# Patient Record
Sex: FEMALE | Race: BLACK OR AFRICAN AMERICAN | Employment: UNEMPLOYED | URBAN - METROPOLITAN AREA
[De-identification: names, ages, dates, MRNs, and addresses within clinical notes are randomized per-mention and may not be internally consistent; named-entity substitution may affect disease eponyms.]

---

## 2018-02-10 ENCOUNTER — HOSPITAL ENCOUNTER (EMERGENCY)
Age: 33
Discharge: HOME | End: 2018-02-10
Attending: EMERGENCY MEDICINE
Payer: MEDICAID

## 2018-02-10 VITALS
WEIGHT: 242.73 LBS | OXYGEN SATURATION: 100 % | SYSTOLIC BLOOD PRESSURE: 135 MMHG | TEMPERATURE: 98.4 F | HEIGHT: 66 IN | BODY MASS INDEX: 39.01 KG/M2 | HEART RATE: 66 BPM | DIASTOLIC BLOOD PRESSURE: 72 MMHG | RESPIRATION RATE: 16 BRPM

## 2018-02-10 DIAGNOSIS — J06.9 VIRAL URI: Primary | ICD-10-CM

## 2018-02-10 PROCEDURE — 99282 EMERGENCY DEPT VISIT SF MDM: CPT

## 2018-02-10 RX ORDER — CETIRIZINE HYDROCHLORIDE, PSEUDOEPHEDRINE HYDROCHLORIDE 5; 120 MG/1; MG/1
1 TABLET, FILM COATED, EXTENDED RELEASE ORAL 2 TIMES DAILY
Qty: 20 TAB | Refills: 0 | Status: SHIPPED | OUTPATIENT
Start: 2018-02-10 | End: 2018-02-10

## 2018-02-10 RX ORDER — CETIRIZINE HYDROCHLORIDE, PSEUDOEPHEDRINE HYDROCHLORIDE 5; 120 MG/1; MG/1
1 TABLET, FILM COATED, EXTENDED RELEASE ORAL 2 TIMES DAILY
Qty: 20 TAB | Refills: 0 | Status: SHIPPED | OUTPATIENT
Start: 2018-02-10 | End: 2018-05-13

## 2018-02-10 NOTE — DISCHARGE INSTRUCTIONS
[facility] SYSTEMS Departments     For adult and child immunizations, family planning, TB screening, STD testing and women's health services. [facility]: [place] [phone]      [facility] 25   [address]   [address]   170 [facility]: Carlos Levin 200 [facility] [address] [phone] [address]          [address] [place] [address]     For primary care services, woman and child wellness, and some clinics providing specialty care. [facility] -- [facility]1 [facility] 2525 [facility] [phone]   411 [facility] 200 [facility] 3614 [facility] [phone]   339 [facility] [address] [facility] [address] [phone] [address]  [employer] [address] [place] [address] 5850  Community  [phone]   7700 [address] [place] [phone]   [facility] 81 [facility] [phone]   [address] [phone]   [facility]: [facility] [address] [place] [address], [address] [phone]     [address] [phone]   [facility] Outreach 5850  Community  [phone]   [employer]  [address], [address] ([email or web address]) [phone]         Sexual Health/Woman Wellness Clinics    For STD/HIV testing and treatment, pregnancy testing and services, men's health, birth control services, LGBT services, and hepatitis/HPV vaccine services. [employer] for [place] [employer] 201 [address]. [facility] [address] [facility] [address] [facility] 1579 600 SERGIOLeandro Prater [phone]   [facility] [address], 5th floor [phone]   Pregnancy [address] 2201 [facility] 118 NLeandro Ortiz [facility] [phone]         Specialty Service 1707 [facility]   [phone]   [employer]   [phone]   Women, Infant and Children's Services: [address] [phone]       600 [facility]   [phone]   [address]   [facility] Psychiatry     [phone]   [address] Crisis   [address] [phone]     Local Primary Care Physicians  [facility] [phone]  MD Dari Taveras MD Evertt Ingles, MD [facility] Doctors [phone]  Christina Mena, FNP  Alfonso Zaragoza, MD Karleen Moritz, MD Beata Dill [place] [address] [phone]  MD Bobby Villa MD 19964 [facility] [phone]  MD Lauren Good MD Arno Cellar, MD Creta Maple, MD   [facility] [phone]  [facility] KELLE MARTE, MD Per Horta MD  Mliss , NP [address] [phone]  MD Olvin Kirkland, MD Jaspal Shore, MD Tylene Mace, MD Dany Batty, MD Pearlean Rubinstein, MD Justo Arena, MD   33 57 [facility]  Glen Palomino MD [address] [phone]  MD Zainab Hernández, NP  MD Eliane Gastelum MD Leonetta Newborn, MD Bea Kapur, MD Stana Chiquito, MD   7880 [facility] [phone]  MD Laurence Ayala, [facility]  Inna Lugo, NP Beverlee Hines, MD Mathew Grad, MD Severo Leech, MD Ihor Lapine, MD [facility] [phone]  MD Shahnaz Camilo MD Benn Albert, MD Hale Lacer, MD Francesca Payment, MD   [address] [phone]  MD Jeremy Sampson MD [place] [phone]  MD Franck Woo MD [facility] Ayaka Nikkies, [address] [facility] Physicians [phone]  Prisca Schulz, MD Bird Orr, MD Armando Davis, MD Paty Estrella, MD Esme Luu, MD Toan Pepe, JOSHUA Almonte MD 1619  66   [phone]  Mi Westbrook, MD Valerie Cortez, MD Marvin Mccracken MD   2102 [facility] [phone]  Bi Price, MD Ernestine Vazquez, FNP  Jacques Garcia, PA-FAYE Garcia, FNP  Kristie Guy, PA-FAYE Flores, MD Quentin Jovel, NP   Alexey Mueller, DO Miscellaneous:  Pravin Taylor [phone]

## 2018-02-10 NOTE — ED PROVIDER NOTES
EMERGENCY DEPARTMENT HISTORY AND PHYSICAL EXAM      Date: 2/10/2018  Patient Name: Dayami Nagy    History of Presenting Illness     Chief Complaint   Patient presents with    Sore Throat     x 1 week, got better and then worse again    Headache     rt frontal headache x1 week    Other     \"I've got these bumps on the back of my tongue, and I just want to make sure it's not. ...you know. \"  No vaginal discharge or other sx       History Provided By: Patient    HPI: Dayami Nagy, 26 y.o. female, presents ambulatory to the ED with cc of right frontal intermittent sinus pressure with associated worsening HA and sore throat for one week. Pt also states that she \"has white bumps on the back of my tongue\". She denies any vaginal discharge    PCP: None    There are no other complaints, changes, or physical findings at this time. Current Outpatient Prescriptions   Medication Sig Dispense Refill    cetirizine-psuedoePHEDrine (ZYRTEC-D) 5-120 mg per tablet Take 1 Tab by mouth two (2) times a day. 20 Tab 0    azithromycin (ZITHROMAX Z-CASA) 250 mg tablet 2 today then 1 daily for 4 days 6 Tab 0    triamcinolone acetonide (KENALOG) 0.1 % topical cream Apply  to affected area two (2) times a day. use thin layer 15 g 0    predniSONE (STERAPRED) 5 mg dose pack See administration instruction per 5mg dose pack 21 Tab 0    lamoTRIgine (LAMICTAL) 25 mg tablet Take  by mouth daily. Past History     Past Medical History:  Past Medical History:   Diagnosis Date    Bipolar disorder ([facility] [address] 11/4/2013    Depression 3/29/2012    Endometriosis 11/20/2012    Obesity 2/23/2015    UTI (lower urinary tract infection) 7/30/2012    Vertigo        Past Surgical History:  Past Surgical History:   Procedure Laterality Date    HX BREAST REDUCTION  2009    HX CHOLECYSTECTOMY  1998    HX DILATION AND CURETTAGE      btl    HX GYN      Tubal ligation, D & C       Family History:  History reviewed. No pertinent family history.     Social History:  Social History   Substance Use Topics    Smoking status: Never Smoker    Smokeless tobacco: Never Used    Alcohol use No       Allergies:  No Known Allergies      Review of Systems   Review of Systems   Constitutional: Negative for fatigue and fever. HENT: Positive for sinus pressure and sore throat. Negative for ear pain. Eyes: Negative for pain, redness and visual disturbance. Respiratory: Negative for cough and shortness of breath. Cardiovascular: Negative for chest pain and palpitations. Gastrointestinal: Negative for abdominal pain, nausea and vomiting. Genitourinary: Negative for dysuria, frequency and urgency. Musculoskeletal: Negative for back pain, gait problem, neck pain and neck stiffness. Skin: Negative for rash and wound. Neurological: Positive for headaches. Negative for dizziness, weakness, light-headedness and numbness. Physical Exam   Physical Exam   Constitutional: She is oriented to person, place, and time. She appears well-developed and well-nourished. Non-toxic appearance. No distress. HENT:   Head: Normocephalic and atraumatic. Right Ear: External ear normal.   Left Ear: External ear normal.   Nose: Nose normal.   Mouth/Throat: Uvula is midline. No trismus in the jaw. No oropharyngeal exudate, posterior oropharyngeal edema or posterior oropharyngeal erythema. Tonsils are flat   Eyes: Conjunctivae and EOM are normal. Pupils are equal, round, and reactive to light. No scleral icterus. Neck: Normal range of motion and full passive range of motion without pain. Cardiovascular: Normal rate and regular rhythm. Pulmonary/Chest: Effort normal. No accessory muscle usage. No tachypnea. No respiratory distress. She has no decreased breath sounds. She has no wheezes. Abdominal: Soft. There is no tenderness. Musculoskeletal: Normal range of motion. Neurological: She is alert and oriented to person, place, and time. She is not disoriented.  No cranial nerve deficit. GCS eye subscore is 4. GCS verbal subscore is 5. GCS motor subscore is 6. Skin: Skin is intact. No rash noted. Psychiatric: She has a normal mood and affect. Her speech is normal.   Nursing note and vitals reviewed. Medical Decision Making   I am the first provider for this patient. I reviewed the vital signs, available nursing notes, past medical history, past surgical history, family history and social history. Vital Signs-Reviewed the patient's vital signs. Patient Vitals for the past 12 hrs:   Temp Pulse Resp BP SpO2   02/10/18 1655 98.4 °F (36.9 °C) 66 16 135/72 100 %       Pulse Oximetry Analysis - 100% on room air    Cardiac Monitor:   Rate: 66 bpm  Rhythm: Normal Sinus Rhythm 135/72     Records Reviewed: Nursing Notes and Old Medical Records    Provider Notes (Medical Decision Making): Afebrile. Well appearing. Well hydrated. Non toxic appearance. Constellation of symptoms suggest viral URI. Plan as below. ED Course:   Initial assessment performed. The patients presenting problems have been discussed, and they are in agreement with the care plan formulated and outlined with them. I have encouraged them to ask questions as they arise throughout their visit. Disposition:  DISCHARGE NOTE  5:38 PM  The patient has been re-evaluated and is ready for discharge. Reviewed available results with patient. Counseled patient on diagnosis and care plan. Patient has expressed understanding, and all questions have been answered. Patient agrees with plan and agrees to follow up as recommended, or return to the ED if their symptoms worsen. Discharge instructions have been provided and explained to the patient, along with reasons to return to the ED. PLAN:  1.    Discharge Medication List as of 2/10/2018  5:37 PM      START taking these medications    Details   cetirizine-psuedoePHEDrine (ZYRTEC-D) 5-120 mg per tablet Take 1 Tab by mouth two (2) times a day., Print, Disp-20 Tab, R-0 CONTINUE these medications which have NOT CHANGED    Details   azithromycin (ZITHROMAX Z-CASA) 250 mg tablet 2 today then 1 daily for 4 days, Print, Disp-6 Tab, R-0      triamcinolone acetonide (KENALOG) 0.1 % topical cream Apply  to affected area two (2) times a day. use thin layer, Print, Disp-15 g, R-0      predniSONE (STERAPRED) 5 mg dose pack See administration instruction per 5mg dose pack, Print, Disp-21 Tab, R-0      lamoTRIgine (LAMICTAL) 25 mg tablet Take  by mouth daily. , Historical Med           2. Follow-up Information     Follow up With Details Comments [address] Schedule an appointment as soon as possible for a visit PRIMARY CARE: call to schedule follow up [address] [place] [address] [phone]        Return to ED if worse     Diagnosis     Clinical Impression:   1. Viral URI        Attestations: This note is prepared by Law Esquivel, acting as Scribe for CLAIRE Dowd. CLAIRE Dowd: The scribe's documentation has been prepared under my direction and personally reviewed by me in its entirety. I confirm that the note above accurately reflects all work, treatment, procedures, and medical decision making performed by me.

## 2018-05-13 ENCOUNTER — HOSPITAL ENCOUNTER (EMERGENCY)
Age: 33
Discharge: HOME | End: 2018-05-13
Attending: EMERGENCY MEDICINE | Admitting: EMERGENCY MEDICINE
Payer: MEDICAID

## 2018-05-13 VITALS
SYSTOLIC BLOOD PRESSURE: 146 MMHG | OXYGEN SATURATION: 100 % | HEART RATE: 63 BPM | RESPIRATION RATE: 16 BRPM | HEIGHT: 66 IN | BODY MASS INDEX: 39.29 KG/M2 | DIASTOLIC BLOOD PRESSURE: 89 MMHG | TEMPERATURE: 98.5 F | WEIGHT: 244.49 LBS

## 2018-05-13 DIAGNOSIS — R30.0 DYSURIA: Primary | ICD-10-CM

## 2018-05-13 DIAGNOSIS — Z20.2 POSSIBLE EXPOSURE TO STD: ICD-10-CM

## 2018-05-13 LAB
APPEARANCE UR: CLEAR
BACTERIA URNS QL MICRO: NEGATIVE /HPF
BILIRUB UR QL: NEGATIVE
COLOR UR: ABNORMAL
EPITH CASTS URNS QL MICRO: ABNORMAL /LPF
GLUCOSE UR STRIP.AUTO-MCNC: NEGATIVE MG/DL
HCG UR QL: NEGATIVE
HGB UR QL STRIP: ABNORMAL
HYALINE CASTS URNS QL MICRO: ABNORMAL /LPF (ref 0–5)
KETONES UR QL STRIP.AUTO: NEGATIVE MG/DL
LEUKOCYTE ESTERASE UR QL STRIP.AUTO: NEGATIVE
NITRITE UR QL STRIP.AUTO: NEGATIVE
PH UR STRIP: 6 [PH] (ref 5–8)
PROT UR STRIP-MCNC: NEGATIVE MG/DL
RBC #/AREA URNS HPF: ABNORMAL /HPF (ref 0–5)
SP GR UR REFRACTOMETRY: 1.02 (ref 1–1.03)
UA: UC IF INDICATED,UAUC: ABNORMAL
UROBILINOGEN UR QL STRIP.AUTO: 0.2 EU/DL (ref 0.2–1)
WBC URNS QL MICRO: ABNORMAL /HPF (ref 0–4)

## 2018-05-13 PROCEDURE — 74011250636 HC RX REV CODE- 250/636: Performed by: PHYSICIAN ASSISTANT

## 2018-05-13 PROCEDURE — 74011000250 HC RX REV CODE- 250: Performed by: PHYSICIAN ASSISTANT

## 2018-05-13 PROCEDURE — 81025 URINE PREGNANCY TEST: CPT | Performed by: EMERGENCY MEDICINE

## 2018-05-13 PROCEDURE — 81001 URINALYSIS AUTO W/SCOPE: CPT | Performed by: EMERGENCY MEDICINE

## 2018-05-13 PROCEDURE — 96372 THER/PROPH/DIAG INJ SC/IM: CPT

## 2018-05-13 PROCEDURE — 74011250637 HC RX REV CODE- 250/637: Performed by: PHYSICIAN ASSISTANT

## 2018-05-13 PROCEDURE — 99282 EMERGENCY DEPT VISIT SF MDM: CPT

## 2018-05-13 RX ORDER — AZITHROMYCIN 250 MG/1
1000 TABLET, FILM COATED ORAL
Status: COMPLETED | OUTPATIENT
Start: 2018-05-13 | End: 2018-05-13

## 2018-05-13 RX ADMIN — LIDOCAINE HYDROCHLORIDE 1 G: 10 INJECTION, SOLUTION EPIDURAL; INFILTRATION; INTRACAUDAL; PERINEURAL at 11:08

## 2018-05-13 RX ADMIN — AZITHROMYCIN 1000 MG: 250 TABLET, FILM COATED ORAL at 11:07

## 2018-05-13 NOTE — ED PROVIDER NOTES
EMERGENCY DEPARTMENT HISTORY AND PHYSICAL EXAM      Date: 5/13/2018  Patient Name: Rolly Ceron    History of Presenting Illness     Chief Complaint   Patient presents with    Urinary Pain     pt reports to ed complaining of pain when using the bathroom for couple weeks returning in the last couple days       History Provided By: Patient    HPI: Rolly Ceron, 26 y.o. female with PMHx significant for depression, UTI, endometriosis, vertigo, bipolar disorder who presents ambulatory to the ED with cc of intermittent worsening dysuria x a couple weeks. Pt denies any associated symptoms. She denies use of medication to modify her symptoms. Pt reports a hx of recurrent UTIs. She denies any fevers, chills, nausea, vomiting, abdominal pain, HA, or hematuria. There are no other complaints, changes, or physical findings at this time. PCP: None        Past History     Past Medical History:  Past Medical History:   Diagnosis Date    Bipolar disorder ([facility] [address] 11/4/2013    Depression 3/29/2012    Endometriosis 11/20/2012    Obesity 2/23/2015    UTI (lower urinary tract infection) 7/30/2012    Vertigo        Past Surgical History:  Past Surgical History:   Procedure Laterality Date    HX BREAST REDUCTION  2009    HX CHOLECYSTECTOMY  1998    HX DILATION AND CURETTAGE      btl    HX GYN      Tubal ligation, D & C       Family History:  History reviewed. No pertinent family history. Social History:  Social History   Substance Use Topics    Smoking status: Never Smoker    Smokeless tobacco: Never Used    Alcohol use No       Allergies:  No Known Allergies      Review of Systems   Review of Systems   Constitutional: Negative for fatigue and fever. HENT: Negative for ear pain and sore throat. Eyes: Negative for pain, redness and visual disturbance. Respiratory: Negative for cough and shortness of breath. Cardiovascular: Negative for chest pain and palpitations.    Gastrointestinal: Negative for abdominal pain, nausea and vomiting. Genitourinary: Positive for dysuria. Negative for frequency, hematuria and urgency. Musculoskeletal: Negative for back pain, gait problem, neck pain and neck stiffness. Skin: Negative for rash and wound. Neurological: Negative for dizziness, weakness, light-headedness, numbness and headaches. Physical Exam   Physical Exam   Constitutional: She is oriented to person, place, and time. She appears well-developed and well-nourished. Non-toxic appearance. No distress. HENT:   Head: Normocephalic and atraumatic. Right Ear: External ear normal.   Left Ear: External ear normal.   Nose: Nose normal.   Mouth/Throat: Uvula is midline. No trismus in the jaw. Eyes: Conjunctivae and EOM are normal. Pupils are equal, round, and reactive to light. No scleral icterus. Neck: Normal range of motion and full passive range of motion without pain. Cardiovascular: Normal rate and regular rhythm. Pulmonary/Chest: Effort normal. No accessory muscle usage. No tachypnea. No respiratory distress. She has no decreased breath sounds. She has no wheezes. Abdominal: Soft. There is no tenderness. Musculoskeletal: Normal range of motion. Neurological: She is alert and oriented to person, place, and time. She is not disoriented. No cranial nerve deficit. GCS eye subscore is 4. GCS verbal subscore is 5. GCS motor subscore is 6. Skin: Skin is intact. No rash noted. Psychiatric: She has a normal mood and affect. Her speech is normal.   Nursing note and vitals reviewed.     Diagnostic Study Results     Labs -     Recent Results (from the past 12 hour(s))   URINALYSIS W/ REFLEX CULTURE    Collection Time: 05/13/18  9:47 AM   Result Value Ref Range    Color YELLOW/STRAW      Appearance CLEAR CLEAR      Specific gravity 1.016 1.003 - 1.030      pH (UA) 6.0 5.0 - 8.0      Protein NEGATIVE  NEG mg/dL    Glucose NEGATIVE  NEG mg/dL    Ketone NEGATIVE  NEG mg/dL    Bilirubin NEGATIVE  NEG Blood SMALL (A) NEG      Urobilinogen 0.2 0.2 - 1.0 EU/dL    Nitrites NEGATIVE  NEG      Leukocyte Esterase NEGATIVE  NEG      WBC 0-4 0 - 4 /hpf    RBC 0-5 0 - 5 /hpf    Epithelial cells FEW FEW /lpf    Bacteria NEGATIVE  NEG /hpf    UA:UC IF INDICATED CULTURE NOT INDICATED BY UA RESULT CNI      Hyaline cast 0-2 0 - 5 /lpf   HCG URINE, QL    Collection Time: 05/13/18  9:47 AM   Result Value Ref Range    HCG urine, QL NEGATIVE  NEG         Medical Decision Making   I am the first provider for this patient. I reviewed the vital signs, available nursing notes, past medical history, past surgical history, family history and social history. Vital Signs-Reviewed the patient's vital signs. Patient Vitals for the past 12 hrs:   Temp Pulse Resp BP SpO2   05/13/18 0917 98.5 °F (36.9 °C) 63 16 146/89 100 %     Records Reviewed: Nursing Notes and Old Medical Records    Provider Notes (Medical Decision Making):     UTI, pyelonephritis    ED Course:   Initial assessment performed. The patients presenting problems have been discussed, and they are in agreement with the care plan formulated and outlined with them. I have encouraged them to ask questions as they arise throughout their visit. PROGRESS NOTE:  10:39 AM  Pt requested to be treated for STIs. Written by Corey Chacon ED scribebony, as dictated by Elijah Breaux. Disposition:    DISCHARGE NOTE  10:44 AM  The patient has been re-evaluated and is ready for discharge. Reviewed available results with patient. Counseled patient on diagnosis and care plan. Patient has expressed understanding, and all questions have been answered. Patient agrees with plan and agrees to follow up as recommended, or return to the ED if their symptoms worsen. Discharge instructions have been provided and explained to the patient, along with reasons to return to the ED. PLAN:  1. There are no discharge medications for this patient.     2.   Follow-up Information     Follow up With Details Comments 110 [facility] Schedule an appointment as soon as possible for a visit Call to schedule follow up [phone]        Return to ED if worse     Diagnosis     Clinical Impression:   1. Dysuria    2. Possible exposure to STD        Attestations: This note is prepared by Giovanni Mckoy, acting as Scribe for Elijah Conde Kidney: The scribe's documentation has been prepared under my direction and personally reviewed by me in its entirety. I confirm that the note above accurately reflects all work, treatment, procedures, and medical decision making performed by me.

## 2018-05-13 NOTE — ED NOTES
Patient presents to ED with complaints of burning and itching when she pees. Patient denies pelvic pain. She also states she has a bump on her tongue and she wanted to make sure it wasn't anything infectious. She states the bump on her tongue is causing some discomfort as well. Patient states she hadn't had sex in 6 years prior to her most recent occurrence and did not use protection. Patient sitting in chair. No further complaints noted. Patient states she came to the ED today to be treated just in case.

## 2018-05-13 NOTE — ED NOTES
CHRIS Childers reviewed discharge instructions with the patient. The patient verbalized understanding. Patient discharged home with stable vitals. Patient ambulatory out of ED with steady gait. No further complaints noted at this time.

## 2018-05-13 NOTE — DISCHARGE INSTRUCTIONS
[facility] SYSTEMS Departments     For adult and child immunizations, family planning, TB screening, STD testing and women's health services. [facility]: [place] [phone]      [facility] 25   [address]   [address]   170 [facility] Street: Reza Vo [address] [phone]      [address]          [address] [place] [address]     For primary care services, woman and child wellness, and some clinics providing specialty care. [facility] -- [address] [facility] 2525 [facility] [phone]   411 [facility] 200 [facility] 3614 [facility] [phone]   339 [facility] [address] [facility] [address] [phone] [address]  [address] [place] [address] 5850  Community  [phone]   [address] [place] [address] [address] [place] [phone]   [facility] 81 [facility] [phone]   [address] [phone]   Crossover Clinic: [facility] [address] [place] [address], [address] [phone]     [facility] [address] [facility] [address] [phone]   [facility] Outreach 5850 [facility]  [phone]   [employer]  [address] ([email or web address]) [phone]         Sexual Health/Woman Wellness Clinics    For STD/HIV testing and treatment, pregnancy testing and services, men's health, birth control services, LGBT services, and hepatitis/HPV vaccine services. [employer] [place] [employer] 201 [facility] [address] [facility] 1579 [address] [phone]   [facility] [address], 5th floor [phone]   Pregnancy [address] for Women [facility] [address] Tilmon Kanner [phone]         Specialty Service 1706 [facility]   [phone]   [place]   [phone]   Women, Infant and Children's Services: [address] [phone]       600 [facility]   [phone]   [address] [place] [address] Psychiatry     [phone]   [address] Crisis   [address] [phone]     Local Primary Care Physicians  [facility] [phone]  Pearla Brittle, MD Angelia Officer, MD Maude Posada MD [facility] Doctors [phone]  Amina De La O, [facility]  Octavia Garcia, MD Jennifer Rocha, MD Beata Wright [place] [address] [phone]  MD Nirali Avalos MD [address] [facility] [phone]  MD Yfn Schaffer MD Enos Better, MD Candance Ngo, MD   [facility] [phone]  [facility], MD Shelli Tracy, MD Buster Mckeon, NP [address] [phone]  Huan Noble, MD Kyler Orta, MD Petra Solis, MD Andie Rivera MD Sherie Higashi, MD Connell Ledger, MD   [address] [facility]  Jordy Seaman MD [address] [facility] [address] [phone]  MD Agnieszka Salter, NP  Edis Taylor, MD Cintia Marte MD Nalani Heron, MD Betti Benedict, MD   9328 [facility] [phone]  Elaine Friday, MD Annia Miller, FNP  Mckeon Shed, NP  MD Leonadr Crabtree MD Elizebeth Ricks, MD Slime Ndiaye MD [facility] [phone]  MD Nhi Nguyen MD Kelle Catalina, MD Kavin Macadamia, MD Panchito Coulter MD   [address] [phone]  MD Lena Devi MD [facility] [phone]  MD Ashley Pearson MD  [facility] Arn[address]fo Burleson, [address] [facility] [phone]  Glenn Santana, MD Milton Robison, MD Adrian Liz, MD Ramos Almazan, MD Porter Schwarz, MD Jayson Barroso, JOSHUA Jules MD 1619 [facility]   [phone]  MD Jie John, MD Marv Ge MD   2102 [facility] [phone]  Bi Price, MD Jayson Navas, ABIOLA Middleton, PAJimiC  Ela Middleton, ABIOLA Zheng, PA-FAYE Torres, MD Eliane Nguyen, JOSHUA Benz, DO Miscellaneous:  Comfort Jay [phone]

## 2018-05-13 NOTE — LETTER
[address] 
\[facility] [place] [facility]\"" EMERGENCY DEPT 
[address] [place] [address] [address] [id number] [phone] Work/School Note Date: 5/13/2018 To Whom It May concern: 
 
Jong Nicholson was seen and treated today in the emergency room by the following provider(s): 
Attending Provider: Laci Horton MD 
Physician Assistant: CHRIS Olson. Jong Nicholson may return to work on [id number]. Sincerely, CHRIS Olson

## 2018-11-12 ENCOUNTER — APPOINTMENT (OUTPATIENT)
Dept: GENERAL RADIOLOGY | Age: 33
End: 2018-11-12
Attending: EMERGENCY MEDICINE
Payer: MEDICAID

## 2018-11-12 ENCOUNTER — APPOINTMENT (OUTPATIENT)
Dept: ULTRASOUND IMAGING | Age: 33
End: 2018-11-12
Attending: EMERGENCY MEDICINE
Payer: MEDICAID

## 2018-11-12 ENCOUNTER — HOSPITAL ENCOUNTER (EMERGENCY)
Age: 33
Discharge: HOME | End: 2018-11-12
Attending: EMERGENCY MEDICINE
Payer: MEDICAID

## 2018-11-12 VITALS
SYSTOLIC BLOOD PRESSURE: 147 MMHG | WEIGHT: 262.79 LBS | OXYGEN SATURATION: 99 % | TEMPERATURE: 97.9 F | DIASTOLIC BLOOD PRESSURE: 92 MMHG | BODY MASS INDEX: 42.42 KG/M2 | RESPIRATION RATE: 20 BRPM | HEART RATE: 61 BPM

## 2018-11-12 DIAGNOSIS — N61.0 MASTITIS: Primary | ICD-10-CM

## 2018-11-12 LAB
ALBUMIN SERPL-MCNC: 3.5 G/DL (ref 3.5–5)
ALBUMIN/GLOB SERPL: 0.9 {RATIO} (ref 1.1–2.2)
ALP SERPL-CCNC: 61 U/L (ref 45–117)
ALT SERPL-CCNC: 21 U/L (ref 12–78)
ANION GAP SERPL CALC-SCNC: 10 MMOL/L (ref 5–15)
AST SERPL-CCNC: 22 U/L (ref 15–37)
ATRIAL RATE: 82 BPM
BASOPHILS # BLD: 0 K/UL (ref 0–0.1)
BASOPHILS NFR BLD: 1 % (ref 0–1)
BILIRUB SERPL-MCNC: 0.3 MG/DL (ref 0.2–1)
BUN SERPL-MCNC: 12 MG/DL (ref 6–20)
BUN/CREAT SERPL: 13 (ref 12–20)
CALCIUM SERPL-MCNC: 9.4 MG/DL (ref 8.5–10.1)
CALCULATED P AXIS, ECG09: 30 DEGREES
CALCULATED R AXIS, ECG10: 21 DEGREES
CALCULATED T AXIS, ECG11: 36 DEGREES
CHLORIDE SERPL-SCNC: 104 MMOL/L (ref 97–108)
CO2 SERPL-SCNC: 25 MMOL/L (ref 21–32)
CREAT SERPL-MCNC: 0.93 MG/DL (ref 0.55–1.02)
D DIMER PPP FEU-MCNC: 0.29 MG/L FEU (ref 0–0.65)
DIAGNOSIS, 93000: NORMAL
DIFFERENTIAL METHOD BLD: ABNORMAL
EOSINOPHIL # BLD: 0.1 K/UL (ref 0–0.4)
EOSINOPHIL NFR BLD: 2 % (ref 0–7)
ERYTHROCYTE [DISTWIDTH] IN BLOOD BY AUTOMATED COUNT: 16.2 % (ref 11.5–14.5)
GLOBULIN SER CALC-MCNC: 4.1 G/DL (ref 2–4)
GLUCOSE SERPL-MCNC: 84 MG/DL (ref 65–100)
HCG UR QL: NEGATIVE
HCT VFR BLD AUTO: 33.5 % (ref 35–47)
HGB BLD-MCNC: 11 G/DL (ref 11.5–16)
IMM GRANULOCYTES # BLD: 0 K/UL (ref 0–0.04)
IMM GRANULOCYTES NFR BLD AUTO: 0 % (ref 0–0.5)
LYMPHOCYTES # BLD: 1.5 K/UL (ref 0.8–3.5)
LYMPHOCYTES NFR BLD: 24 % (ref 12–49)
MCH RBC QN AUTO: 26.4 PG (ref 26–34)
MCHC RBC AUTO-ENTMCNC: 32.8 G/DL (ref 30–36.5)
MCV RBC AUTO: 80.3 FL (ref 80–99)
MONOCYTES # BLD: 0.5 K/UL (ref 0–1)
MONOCYTES NFR BLD: 8 % (ref 5–13)
NEUTS SEG # BLD: 4.2 K/UL (ref 1.8–8)
NEUTS SEG NFR BLD: 66 % (ref 32–75)
NRBC # BLD: 0 K/UL (ref 0–0.01)
NRBC BLD-RTO: 0 PER 100 WBC
P-R INTERVAL, ECG05: 166 MS
PLATELET # BLD AUTO: 308 K/UL (ref 150–400)
PMV BLD AUTO: 11.9 FL (ref 8.9–12.9)
POTASSIUM SERPL-SCNC: 4.2 MMOL/L (ref 3.5–5.1)
PROT SERPL-MCNC: 7.6 G/DL (ref 6.4–8.2)
Q-T INTERVAL, ECG07: 380 MS
QRS DURATION, ECG06: 86 MS
QTC CALCULATION (BEZET), ECG08: 443 MS
RBC # BLD AUTO: 4.17 M/UL (ref 3.8–5.2)
SODIUM SERPL-SCNC: 139 MMOL/L (ref 136–145)
TROPONIN I SERPL-MCNC: <0.05 NG/ML
VENTRICULAR RATE, ECG03: 82 BPM
WBC # BLD AUTO: 6.4 K/UL (ref 3.6–11)

## 2018-11-12 PROCEDURE — 80053 COMPREHEN METABOLIC PANEL: CPT

## 2018-11-12 PROCEDURE — 71046 X-RAY EXAM CHEST 2 VIEWS: CPT

## 2018-11-12 PROCEDURE — 74011250636 HC RX REV CODE- 250/636: Performed by: EMERGENCY MEDICINE

## 2018-11-12 PROCEDURE — 36415 COLL VENOUS BLD VENIPUNCTURE: CPT

## 2018-11-12 PROCEDURE — 85379 FIBRIN DEGRADATION QUANT: CPT

## 2018-11-12 PROCEDURE — 85025 COMPLETE CBC W/AUTO DIFF WBC: CPT

## 2018-11-12 PROCEDURE — 81025 URINE PREGNANCY TEST: CPT

## 2018-11-12 PROCEDURE — 96374 THER/PROPH/DIAG INJ IV PUSH: CPT

## 2018-11-12 PROCEDURE — 99284 EMERGENCY DEPT VISIT MOD MDM: CPT

## 2018-11-12 PROCEDURE — 84484 ASSAY OF TROPONIN QUANT: CPT

## 2018-11-12 PROCEDURE — 74011250637 HC RX REV CODE- 250/637: Performed by: EMERGENCY MEDICINE

## 2018-11-12 PROCEDURE — 76642 ULTRASOUND BREAST LIMITED: CPT

## 2018-11-12 PROCEDURE — 93005 ELECTROCARDIOGRAM TRACING: CPT

## 2018-11-12 RX ORDER — AMOXICILLIN AND CLAVULANATE POTASSIUM 875; 125 MG/1; MG/1
1 TABLET, FILM COATED ORAL 2 TIMES DAILY
Qty: 20 TAB | Refills: 0 | Status: SHIPPED | OUTPATIENT
Start: 2018-11-12 | End: 2018-11-22

## 2018-11-12 RX ORDER — KETOROLAC TROMETHAMINE 30 MG/ML
30 INJECTION, SOLUTION INTRAMUSCULAR; INTRAVENOUS
Status: COMPLETED | OUTPATIENT
Start: 2018-11-12 | End: 2018-11-12

## 2018-11-12 RX ORDER — ONDANSETRON 4 MG/1
8 TABLET, ORALLY DISINTEGRATING ORAL
Status: COMPLETED | OUTPATIENT
Start: 2018-11-12 | End: 2018-11-12

## 2018-11-12 RX ORDER — OXYCODONE AND ACETAMINOPHEN 5; 325 MG/1; MG/1
1 TABLET ORAL
Status: COMPLETED | OUTPATIENT
Start: 2018-11-12 | End: 2018-11-12

## 2018-11-12 RX ADMIN — ONDANSETRON 8 MG: 4 TABLET, ORALLY DISINTEGRATING ORAL at 07:40

## 2018-11-12 RX ADMIN — OXYCODONE AND ACETAMINOPHEN 1 TABLET: 5; 325 TABLET ORAL at 07:40

## 2018-11-12 RX ADMIN — KETOROLAC TROMETHAMINE 30 MG: 30 INJECTION, SOLUTION INTRAMUSCULAR at 06:29

## 2018-11-12 NOTE — ED TRIAGE NOTES
Pt complains of left sided breast pain that friday. Pt denies n/v, c/p, sob, trauma/injury. Pain is a 10/10.

## 2018-11-12 NOTE — DISCHARGE INSTRUCTIONS
Mastitis: Care Instructions  Your Care Instructions  Mastitis is an inflammation of the breast. It occurs most often in women who are breastfeeding, but it can affect any woman. Mastitis can be caused by poor milk flow from the breast. When milk builds up in a breast, it leaks into the nearby breast tissue. Infection can also develop when the nipples become cracked or irritated. The tissue can then become infected with bacteria. Antibiotics can usually cure mastitis. For women who are nursing, continued breastfeeding (or pumping) can help. If mastitis is not treated, a pocket of pus may form in the breast and need to be drained. Follow-up care is a key part of your treatment and safety. Be sure to make and go to all appointments, and call your doctor if you are having problems. It's also a good idea to know your test results and keep a list of the medicines you take. How can you care for yourself at home? · If your doctor prescribed antibiotics, take them as directed. Do not stop taking them just because you feel better. You need to take the full course of antibiotics. · If you are breastfeeding, continue breastfeeding or pumping breast milk. It is important to empty your breasts regularly, every 2 to 3 hours while you are awake. These tips may help:  ? Before breastfeeding, place a warm, wet washcloth over your breast for about 15 minutes. Try this at least 3 times a day. This increases milk flow in the breast. Massaging the affected breast may also increase milk flow. ? Breastfeed on both sides. Try to start with your healthy breast first. Then, after your milk is flowing, breastfeed from the affected breast until it feels soft. You should empty this breast completely. Then switch back to the healthy breast and breastfeed until your baby has finished. ? Pump or hand-express a small amount of breast milk before breastfeeding if your breasts are too full with milk.  This will make your breasts less full and may make it easier for your baby to latch on to your breast.  ? Pump or express milk from the affected breast if it hurts too much to breastfeed. · Take an over-the-counter pain medicine, such as acetaminophen (Tylenol) or ibuprofen (Advil, Motrin) to relieve pain and fever. Read and follow all instructions on the label. · Do not take two or more pain medicines at the same time unless the doctor told you to. Many pain medicines have acetaminophen, which is Tylenol. Too much acetaminophen (Tylenol) can be harmful. · Rest as much as possible. · Drink extra fluids. · If pus is draining from your infected breast, wash the nipple gently and let it air-dry before you put your bra back on. A disposable breast pad placed in the bra cup may absorb the pus. When should you call for help? Call your doctor now or seek immediate medical care if:    · You have worse symptoms of a breast infection, such as:  ? Increased pain, swelling, redness, or warmth around a breast.  ? Red streaks leading from a breast.  ? Pus draining from a breast.  ? A fever.    Watch closely for changes in your health, and be sure to contact your doctor if:    · You do not get better as expected. Where can you learn more? Go to http://rohith-phong.info/. Enter Y207 in the search box to learn more about \"Mastitis: Care Instructions. \"  Current as of: November 21, 2017  Content Version: 11.8  © 3660-5847 [employer]. Care instructions adapted under license by [employer] (which disclaims liability or warranty for this information). If you have questions about a medical condition or this instruction, always ask your healthcare professional. [place] [address] any warranty or liability for your use of this information.

## 2018-11-12 NOTE — ED PROVIDER NOTES
HPI       26year old female with bipolar d/o endometriosis, vertigo, presents with severe right breast pain. Started over the weekend with chest tightness. Feels sob at times. Denies injury. Now the right breast is very tender to touch. Taking OTC pain relievers without relief. No fever cough or cold symptoms. No nipple drainage. No recent pregnancy. No injury. Right calf feels funny, denies swelling. No travel. No birth control. Does not smoke. No htn, dm chol. Pain is 10/10  Past Medical History:   Diagnosis Date    Bipolar disorder ([facility] [address] 11/4/2013    Depression 3/29/2012    Endometriosis 11/20/2012    Obesity 2/23/2015    UTI (lower urinary tract infection) 7/30/2012    Vertigo        Past Surgical History:   Procedure Laterality Date    HX BREAST REDUCTION  2009    HX CHOLECYSTECTOMY  1998    HX DILATION AND CURETTAGE      btl    HX GYN      Tubal ligation, D & C         No family history on file. Social History     Socioeconomic History    Marital status: SINGLE     Spouse name: Not on file    Number of children: Not on file    Years of education: Not on file    Highest education level: Not on file   Social Needs    Financial resource strain: Not on file    Food insecurity - worry: Not on file    Food insecurity - inability: Not on file    Transportation needs - medical: Not on file   [employer] needs - non-medical: Not on file   Occupational History    Not on file   Tobacco Use    Smoking status: Never Smoker    Smokeless tobacco: Never Used   Substance and Sexual Activity    Alcohol use: No    Drug use: No    Sexual activity: Yes     Partners: Male     Birth control/protection: None, Surgical   Other Topics Concern    Not on file   Social History Narrative    ** Merged History Encounter **         ** Merged History Encounter **              ALLERGIES: Patient has no known allergies. Review of Systems   Constitutional: Negative for fever. HENT: Negative for congestion. Eyes: Negative for visual disturbance. Respiratory: Positive for shortness of breath. Cardiovascular: Positive for chest pain. Negative for palpitations and leg swelling. Gastrointestinal: Negative for abdominal pain, nausea and vomiting. Endocrine: Negative for polyuria. Genitourinary: Negative for dysuria. Musculoskeletal: Negative for gait problem. Skin: Negative for rash. Neurological: Negative for headaches. Psychiatric/Behavioral: Negative for dysphoric mood. Vitals:    11/12/18 0530 11/12/18 0545   BP:  142/77   Pulse: (!) 102 75   Resp:  22   Temp:  98.5 °F (36.9 °C)   SpO2: 99% 100%   Weight: 119.2 kg (262 lb 12.6 oz)             Physical Exam   Constitutional: She is oriented to person, place, and time. She appears well-developed and well-nourished. No distress. HENT:   Head: Normocephalic and atraumatic. Mouth/Throat: No oropharyngeal exudate. Eyes: Pupils are equal, round, and reactive to light. Right eye exhibits no discharge. Left eye exhibits no discharge. No scleral icterus. Neck: Normal range of motion. Neck supple. No JVD present. Cardiovascular: Normal rate, regular rhythm and normal heart sounds. No murmur heard. Pulmonary/Chest: Effort normal and breath sounds normal. No stridor. No respiratory distress. She has no wheezes. She has no rales. She exhibits no tenderness. Abdominal: Soft. Bowel sounds are normal. She exhibits no distension and no mass. There is no tenderness. There is no rebound and no guarding. Musculoskeletal: Normal range of motion. Right breast large then left  without erythema or warmth  No drainage from nipple  Markedly tender at 12o'clock position, unable to evaluate for mass due to pain/patient intolerance   Neurological: She is oriented to person, place, and time. Skin: Skin is warm and dry. Capillary refill takes less than 2 seconds. No rash noted. Psychiatric: She has a normal mood and affect.  Her behavior is normal. Judgment and thought content normal.        MDM       Procedures      ED EKG interpretation:  Rhythm: normal sinus rhythm; and regular . Rate (approx.): 82; Axis: normal; P wave: normal; QRS interval: normal ; ST/T wave: non-specific changes;. This EKG was interpreted by Peter Luna MD,ED Provider. labs/cxr clear. Will ultrasound breast to further evaluate. Mom at bedside. Will medicate with percocet since she has a ride. toradol did not help. Care signed over to dr. Jethro Daniels at change of shift.

## 2018-11-12 NOTE — LETTER
[address] 
[address] [facility] [address] [phone] 
[phone] Work/School Note Date: 11/12/2018 To Whom It May concern: 
 
Raisa Bustamante was seen and treated today in the emergency room by the following provider(s): 
Attending Provider: Inna Aparicio MD.   
 
Suzzane Primrose Hurt {Return to school/sport/work:52685} Sincerely, 
 
 
 
 
Joss Saul

## 2018-11-12 NOTE — LETTER
[address] 
[address] [facility] [address] 
[phone] Work/School Note Date: 11/12/2018 To Whom It May concern: 
 
Joan Jaime was seen and treated today in the emergency room by the following provider(s): 
Attending Provider: Karla Eller MD.   
 
Joan Jaime may return to work 11/17/2018 Sincerely, 
 
 
 
 
Selina Pringle

## 2018-11-12 NOTE — LETTER
[address] 
[address] [place] [phone] 
[phone] Work/School Note Date: 11/12/2018 To Whom It May concern: 
 
Mariely Dale was seen and treated today in the emergency room by the following provider(s): 
Attending Provider: Shabana Soto MD.   
 
Mariely Suyapa return to work 11/17/ Sincerely, 
 
 
 
 
Mauri Grier

## 2018-11-12 NOTE — ED NOTES
Patient verbalizes understanding of discharge instructions, opportunity for questions provided, patient denies any. Patient ambulatory and no acute distress at discharge.

## 2018-11-15 ENCOUNTER — OFFICE VISIT (OUTPATIENT)
Dept: SURGERY | Age: 33
End: 2018-11-15

## 2018-11-15 VITALS
DIASTOLIC BLOOD PRESSURE: 94 MMHG | HEIGHT: 66 IN | BODY MASS INDEX: 42.11 KG/M2 | HEART RATE: 81 BPM | WEIGHT: 262 LBS | SYSTOLIC BLOOD PRESSURE: 155 MMHG

## 2018-11-15 DIAGNOSIS — N64.4 MASTODYNIA OF RIGHT BREAST: Primary | ICD-10-CM

## 2018-11-15 RX ORDER — LIDOCAINE 50 MG/G
PATCH TOPICAL
Qty: 15 EACH | Refills: 0 | Status: SHIPPED | OUTPATIENT
Start: 2018-11-15 | End: 2019-01-08 | Stop reason: SDUPTHER

## 2018-11-15 NOTE — PROGRESS NOTES
HISTORY OF PRESENT ILLNESS  Catrachita Renteria is a 26 y.o. female. HPI  NEW patient consult referred by Emergency Department for RIGHT breast pain/mastitis. On Friday, patient had chest pain. On , she woke up and her RIGHT breast was hurting. It did not improve, so on Monday she went to ED. Ultrasound done and patient prescribed amoxicillin and ibuprofen. The pain is an 8/10. The skin is lighter. Denies nipple discharge/retraction. She is here with her mother. Obstetric History       T3      L0     SAB0   TAB0   Ectopic0   Molar0   Multiple0   Live Births0    Obstetric Comments   Menarche 13 LMP 10/2018, # of children 3, age of 4st delivery 23, Hysterectomy/oophorectomy yes/yes, Breast bx no, history of breast feeding no, BCP no, Hormone therapy no     Family History:  Maternal cousin is a survivor of breast cancer, dx at 54. Ultrasound, 16 - debris in ducts  INDICATION: Severe right breast pain.      Targeted sonographic evaluation of the retroareolar right breast is performed. Comparison sonographic images of the retroareolar left breast are obtained.     Direct comparison is made to prior bilateral mammogram, performed 2013.     There are asymmetric, mildly prominent ducts within the right breast with a  suggestion of hypoechoic soft tissue within some of the ducts. There is also 1  cm x 6 mm x 8 mm focal prominent duct with avascular debris or soft tissue  within it. There is no large, drainable focal fluid collection. Left breast  retroareolar ducts are nondilated.     IMPRESSION  IMPRESSION: Right breast mildly prominent ducts with containing hypoechoic soft  tissue or debris in continuity with a 1 cm x 6 mm x 8 mm focally prominent duct  which also contains soft tissue or debris. No drainable focal fluid collection  at this time. Given the patient's clinical history, findings likely represent an  infectious process and neoplasm is considered less likely.  Recommend short  interval follow-up right breast ultrasound and possible diagnostic mammogram,  after presumed antimicrobial treatment and resolution of symptoms. Review of Systems   Constitutional: Negative. HENT: Negative. Eyes: Negative. Respiratory: Negative. Cardiovascular: Negative. Gastrointestinal: Negative. Genitourinary: Negative. Musculoskeletal: Negative. Skin: Negative. Neurological: Negative. Endo/Heme/Allergies: Negative. Psychiatric/Behavioral: Negative. All other systems reviewed and are negative. Physical Exam   Pulmonary/Chest: Right breast exhibits tenderness (pt reports severe pain with light palpation along the superior periareolar margin). Right breast exhibits no mass, no nipple discharge and no skin change. Left breast exhibits no mass, no nipple discharge, no skin change and no tenderness. Breasts are symmetrical.       Lymphadenopathy:     She has no cervical adenopathy. She has no axillary adenopathy. Right: No supraclavicular adenopathy present. Left: No supraclavicular adenopathy present. BREAST ULTRASOUND  Indication: Right  breast pain  Technique: The area was scanned using a high-frequency linear-array near-field transducer  Findings: No abnormal mass, lesion, or shadowing noted. No abscess. Normal subareolar milk ducts. Impression: Normal breast tissue   Disposition: No worrisome finding on ultrasound  ASSESSMENT and PLAN    ICD-10-CM ICD-9-CM    1. Mastodynia of right breast N64.4 611.71      RIGHT breast pain. No worrisome finding on physical exam or ultrasound. No palpable mass, no erythema, no skin thickening, no nipple discharge. Normal subareolar milk ducts. Pt reassured that she does not have infection or any signs of breast cancer. Discussed marcaine injection or lidoderm patches. Pt will try the patches. Pt and mother were dissatisfied that I did not have a reason for the pain.   She may follow up with  [facility].

## 2018-11-15 NOTE — PATIENT INSTRUCTIONS
Breast Pain: Care Instructions  Your Care Instructions    Breast tenderness and pain may come and go with your monthly periods (cyclic), or it may not follow any pattern (noncyclic). Breast pain is rarely caused by a serious health problem. You may need tests to find the cause. Follow-up care is a key part of your treatment and safety. Be sure to make and go to all appointments, and call your doctor if you are having problems. It's also a good idea to know your test results and keep a list of the medicines you take. How can you care for yourself at home? · If your doctor gave you medicine, take it exactly as prescribed. Call your doctor if you think you are having a problem with your medicine. · Take an over-the-counter pain medicine, such as acetaminophen (Tylenol), ibuprofen (Advil, Motrin), or naproxen (Aleve), to relieve pain and swelling. Read and follow all instructions on the label. · Do not take two or more pain medicines at the same time unless the doctor told you to. Many pain medicines have acetaminophen, which is Tylenol. Too much acetaminophen (Tylenol) can be harmful. · Wear a supportive bra, such as a sports bra or a jog bra. · Cut down on the amount of fat in your diet. If you need help planning healthy meals, see a dietitian. · Get at least 30 minutes of exercise on most days of the week. Walking is a good choice. You also may want to do other activities, such as running, swimming, cycling, or playing tennis or team sports. · Keep a healthy sleep pattern. Go to bed at the same time every night, and get up at the same time every day. When should you call for help? Call your doctor now or seek immediate medical care if:    · You have new changes in a breast, such as:  ? A lump or thickening in your breast or armpit. ? A change in the breast's size or shape. ? Skin changes, such as dimples or puckers. ? Nipple discharge.   ? A change in the color or feel of the skin of your breast or the darker area around the nipple (areola). ? A change in the shape of the nipple (it may look like it's being pulled into the breast).     · You have symptoms of a breast infection, such as:  ? Increased pain, swelling, redness, or warmth around a breast.  ? Red streaks extending from the breast.  ? Pus draining from a breast.  ? A fever.    Watch closely for changes in your health, and be sure to contact your doctor if:    · Your breast pain does not get better after 1 week.     · You have a lump or thickening in your breast or armpit. Where can you learn more? Go to http://rohith-phong.info/. Enter X029 in the search box to learn more about \"Breast Pain: Care Instructions. \"  Current as of: November 20, 2017  Content Version: 11.8  © 5680-5716 [employer]. Care instructions adapted under license by [employer] (which disclaims liability or warranty for this information). If you have questions about a medical condition or this instruction, always ask your healthcare professional. [address] any warranty or liability for your use of this information.

## 2018-11-19 ENCOUNTER — HOSPITAL ENCOUNTER (OUTPATIENT)
Dept: MAMMOGRAPHY | Age: 33
Discharge: HOME | End: 2018-11-19
Attending: SURGERY
Payer: MEDICAID

## 2018-11-19 ENCOUNTER — TELEPHONE (OUTPATIENT)
Dept: SURGERY | Age: 33
End: 2018-11-19

## 2018-11-19 DIAGNOSIS — N64.4 BREAST PAIN, RIGHT: ICD-10-CM

## 2018-11-19 DIAGNOSIS — N64.4 BREAST PAIN, RIGHT: Primary | ICD-10-CM

## 2018-11-19 DIAGNOSIS — Z12.39 SCREENING FOR BREAST CANCER: Primary | ICD-10-CM

## 2018-11-19 PROCEDURE — 77066 DX MAMMO INCL CAD BI: CPT

## 2018-11-19 NOTE — PROGRESS NOTES
Placed order for diagnostic mammogram per request from Marielena Santamaria per Dr. Eugenio Alvarez.

## 2018-11-19 NOTE — TELEPHONE ENCOUNTER
Returned Hello message from patient who stated that she is continuing to experience RIGHT breast pain and SOB for over 1 week. I discussed with Dr. Hamida Carlson who suggested a mammogram. If she continues to experience SOB, she needs to see a doctor. She says that she is not having trouble breathing, but thinks that the breast pain is radiates in a way that makes it \"hurt\" when she takes a breath. I placed mammogram order in [address] [place] [address] and gave her number to central scheduling. Patient requests to have her mammogram done at [facility]. I told her to specify this with scheduling. I also advised that she get a PCP. She says that she has one. I gave her name of [address] [place] [address] if she needs one. She said that her PCP deferred her \"SOB\" pain to our breast clinic. Will start with mammogram and take it from there. She was appreciative. I advised her to go to ED if she has trouble breathing.

## 2018-12-13 ENCOUNTER — TELEPHONE (OUTPATIENT)
Dept: SURGERY | Age: 33
End: 2018-12-13

## 2018-12-13 NOTE — TELEPHONE ENCOUNTER
The patient called concerned with her breast pain that has never gone away. She has  tried lidocaine patches and heat with minimal relief. I explained that sometimes breast pain will come and go without any causative factor revealed. I recommended she come in to see Dr. Lb Nation for a possible marcaine injection but she is very against that. She is aware to come back in 2/2019 to have a repeat ultrasound and try ibuprofen and tylenol alternating the dosages. The patient was appreciative and thankful for the callback.

## 2019-01-10 RX ORDER — LIDOCAINE 50 MG/G
PATCH TOPICAL
Qty: 15 PATCH | Refills: 0 | Status: SHIPPED | OUTPATIENT
Start: 2019-01-10 | End: 2020-07-22 | Stop reason: ALTCHOICE

## 2019-06-30 ENCOUNTER — HOSPITAL ENCOUNTER (EMERGENCY)
Age: 34
Discharge: HOME | End: 2019-06-30
Attending: EMERGENCY MEDICINE
Payer: MEDICAID

## 2019-06-30 VITALS
HEART RATE: 85 BPM | BODY MASS INDEX: 39.43 KG/M2 | HEIGHT: 66 IN | OXYGEN SATURATION: 100 % | RESPIRATION RATE: 16 BRPM | DIASTOLIC BLOOD PRESSURE: 104 MMHG | WEIGHT: 245.37 LBS | SYSTOLIC BLOOD PRESSURE: 145 MMHG | TEMPERATURE: 98.7 F

## 2019-06-30 DIAGNOSIS — L50.9 LOCALIZED HIVES: Primary | ICD-10-CM

## 2019-06-30 PROCEDURE — 74011250637 HC RX REV CODE- 250/637: Performed by: PHYSICIAN ASSISTANT

## 2019-06-30 PROCEDURE — 99283 EMERGENCY DEPT VISIT LOW MDM: CPT

## 2019-06-30 PROCEDURE — 74011636637 HC RX REV CODE- 636/637: Performed by: PHYSICIAN ASSISTANT

## 2019-06-30 RX ORDER — PREDNISONE 10 MG/1
60 TABLET ORAL
Status: COMPLETED | OUTPATIENT
Start: 2019-06-30 | End: 2019-06-30

## 2019-06-30 RX ORDER — PREDNISONE 10 MG/1
TABLET ORAL
Qty: 21 TAB | Refills: 0 | Status: SHIPPED | OUTPATIENT
Start: 2019-06-30 | End: 2020-07-22 | Stop reason: ALTCHOICE

## 2019-06-30 RX ORDER — HYDROXYZINE 25 MG/1
50 TABLET, FILM COATED ORAL
Status: COMPLETED | OUTPATIENT
Start: 2019-06-30 | End: 2019-06-30

## 2019-06-30 RX ORDER — HYDROXYZINE 50 MG/1
50 TABLET, FILM COATED ORAL
Qty: 20 TAB | Refills: 0 | Status: SHIPPED | OUTPATIENT
Start: 2019-06-30 | End: 2019-07-10

## 2019-06-30 RX ADMIN — PREDNISONE 60 MG: 10 TABLET ORAL at 22:19

## 2019-06-30 RX ADMIN — HYDROXYZINE HYDROCHLORIDE 50 MG: 25 TABLET, FILM COATED ORAL at 22:19

## 2019-07-01 NOTE — ED PROVIDER NOTES
EMERGENCY DEPARTMENT HISTORY AND PHYSICAL EXAM      Date: 6/30/2019  Patient Name: Nain Sim    History of Presenting Illness     Chief Complaint   Patient presents with    Itching     \"something is attacking my back\"; burning and \"bumps\" to R side of back; pt denies any allergies, pt denies any new soaps, lotions, detergents     History Provided By: Patient    HPI: Nain Sim, 37 y.o. female presents by POV to the ED with cc of itching and burning to her right mid back. She notes that the Sx started just PTA when she was leaving a friends house. She is unsure if thing got underneath of her shirt and bit her skin. The patient's daughter has pictures of the swelling on her back, which has much improved since onset but there being no treatment prior to arrival.  Patient denies a history of allergic reactions. There is been no new soaps, detergents, pets, known potential allergens. There are no other complaints, changes, or physical findings at this time. Social Hx: Tobacco (denies), EtOH (denies), Illicit drug use (denies)     PCP: None    No current facility-administered medications on file prior to encounter. Current Outpatient Medications on File Prior to Encounter   Medication Sig Dispense Refill    lidocaine (LIDODERM) 5 % APPLY PATCH TO THE AFFECTED AREA FOR 12 HOURS A DAY AND REMOVE FOR 12 HOURS A DAY. 15 Patch 0       Past History     Past Medical History:  Past Medical History:   Diagnosis Date    Bipolar disorder ([facility] [address] 11/4/2013    Depression 3/29/2012    Endometriosis 11/20/2012    Obesity 2/23/2015    UTI (lower urinary tract infection) 7/30/2012    Vertigo        Past Surgical History:  Past Surgical History:   Procedure Laterality Date    HX BREAST REDUCTION  2009    In 2010 patient had to have scar tissue removed.     HX CHOLECYSTECTOMY  1998    HX DILATION AND CURETTAGE      btl    HX GYN      Tubal ligation, D & C       Family History:  Family History   Problem Relation Age of Onset    Breast Cancer Cousin        Social History:  Social History     Tobacco Use    Smoking status: Never Smoker    Smokeless tobacco: Never Used   Substance Use Topics    Alcohol use: No    Drug use: No       Allergies:  No Known Allergies      Review of Systems   Review of Systems   Constitutional: Negative for chills, diaphoresis and fever. HENT: Negative for congestion, ear pain, rhinorrhea and sore throat. Respiratory: Negative for cough and shortness of breath. Cardiovascular: Negative for chest pain. Gastrointestinal: Negative for abdominal pain, constipation, diarrhea, nausea and vomiting. Genitourinary: Negative for difficulty urinating, dysuria, frequency and hematuria. Musculoskeletal: Negative for arthralgias and myalgias. Skin: Positive for rash. Neurological: Negative for headaches. All other systems reviewed and are negative. Physical Exam   Physical Exam   Constitutional: She is oriented to person, place, and time. She appears well-developed and well-nourished. No distress. 37 y.o. -American female    HENT:   Head: Normocephalic and atraumatic. Eyes: Conjunctivae are normal. Right eye exhibits no discharge. Left eye exhibits no discharge. Neck: Normal range of motion. Neck supple. Cardiovascular: Normal rate, regular rhythm and normal heart sounds. No murmur heard. Pulmonary/Chest: Effort normal and breath sounds normal. No respiratory distress. Neurological: She is alert and oriented to person, place, and time. Skin: Skin is warm and dry. She is not diaphoretic. Small hives to the right mid back. Psychiatric: She has a normal mood and affect. Her behavior is normal.   Nursing note and vitals reviewed. Diagnostic Study Results     Labs - None    Radiologic Studies - None    Medical Decision Making   I am the first provider for this patient.     I reviewed the vital signs, available nursing notes, past medical history, past surgical history, family history and social history. Vital Signs-Reviewed the patient's vital signs. Patient Vitals for the past 12 hrs:   Temp Pulse Resp BP SpO2   06/30/19 2225 -- -- -- (!) 145/104 --   06/30/19 2144 98.7 °F (37.1 °C) 85 16 (!) 200/116 100 %     Records Reviewed: Nursing Notes    Provider Notes (Medical Decision Making): Allergic reaction, insect bite, dermatitis, hives    ED Course:   Initial assessment performed. The patients presenting problems have been discussed, and they are in agreement with the care plan formulated and outlined with them. I have encouraged them to ask questions as they arise throughout their visit. Critical Care Time: None    Disposition:  DISCHARGE NOTE:  10:26 PM  The pt is ready for discharge. The pt's signs, symptoms, diagnosis, and discharge instructions have been discussed and pt has conveyed their understanding. The pt is to follow up as recommended or return to ER should their symptoms worsen. Plan has been discussed and pt is in agreement. PLAN:  1. Current Discharge Medication List      START taking these medications    Details   predniSONE (STERAPRED DS) 10 mg dose pack Standard 6 day taper  Qty: 21 Tab, Refills: 0      hydrOXYzine HCl (ATARAX) 50 mg tablet Take 1 Tab by mouth every six (6) hours as needed for Itching for up to 10 days. Qty: 20 Tab, Refills: 0         CONTINUE these medications which have NOT CHANGED    Details   lidocaine (LIDODERM) 5 % APPLY PATCH TO THE AFFECTED AREA FOR 12 HOURS A DAY AND REMOVE FOR 12 HOURS A DAY. Qty: 15 Patch, Refills: 0           2. Follow-up Information     Follow up With Specialties Details Why Contact Info    Your PCP  In 1 week As needed -please use the attached list of facilities to locate one that can meet your nonemergent medical needs. Return to ED if worse     Diagnosis     Clinical Impression:   1.  Localized hives          Please note that this dictation was completed with daniela Bustos computer voice recognition software. Quite often unanticipated grammatical, syntax, homophones, and other interpretive errors are inadvertently transcribed by the computer software. Please disregards these errors. Please excuse any errors that have escaped final proofreading. This note will not be viewable in [address]

## 2019-07-01 NOTE — DISCHARGE INSTRUCTIONS
[facility] SYSTEMS Departments     For adult and child immunizations, family planning, TB screening, STD testing and women's health services. [facility] [phone]      [facility] 25   [address]   [address]   170 [facility] [place] [place] [address] [facility] [address] [phone]      2400 [facility]          [address] [address]     For primary care services, woman and child wellness, and some clinics providing specialty care. [facility] -- [address] 2525 [facility] [phone]   411 [facility] 200 [facility] 3614 [facility] [phone]   339 [facility] [address] [phone] [address]  [employer] [address] [place] [address] 5850  Community  [phone]   [address] [place] [address] [phone]   [facility] 81 [facility] [phone]   [facility] [address] [place] [address] [phone]   [facility]: [facility] [address] [place] [address], [address] [phone]     Jluis [address] [phone]   [facility] 5850 [facility]  [phone]   [employer]  [address] ([email or web address]) [phone]         Sexual Health/Woman Wellness Clinics    For STD/HIV testing and treatment, pregnancy testing and services, men's health, birth control services, LGBT services, and hepatitis/HPV vaccine services. [facility] [employer] for [place] [employer] [address] [facility] [address] [facility] [address] [facility] 1579 [address] [facility] [phone]   [facility] [address], 5th floor [phone]   Pregnancy [address] for Women [facility]  Esthela Luis [phone]         Specialty Service [address]    [phone]   [place]   [phone]   Women, Infant and Children's Services: [address] [phone]       600 [facility]   [phone]   [address]   [facility]     [phone]   [address] Crisis   [address] [phone]     Local Primary Care Physicians  [facility] [phone]  MD Yessenia Schrader MD Devon Flank, MD [facility] [phone]  Yris Amezcua, [facility]  MD Ezequiel Pitts, MD [facility] [phone]  [place] [address] [facility] [phone]  MD Mj Lubin MD Waynetta Norrie, MD Maida Iha, MD   [facility] [phone]  CCXO NJEPPX PX, MD [place] Forbes, NP [address] [phone]  Cody Mccartney, MD Cholo Nassar MD Richarda Dearth, MD [address], MD Rosa Maria Saldivar MD   33 57 [facility]  Gail Caballero MD [address] [facility] [address] [phone]  MD Dahlia Zaragoza, NP Leopoldo Netters, MD Krista Libra, MD Raliegh Ana, MD Blondie Spire, MD Marguerite Reining, MD   4942 [facility] [phone]  MD Matthew Pina, [facility]  Gideon Melvin, MD Moses Richard MD Danise Jensen, MD Zachery Morgans, MD [facility] [phone]  MD Juliann Johnson MD Rheba Huxley, MD Candy Dolin, MD Ana Mainland, MD   [address] [phone]  MD Brett Perez, MD Jones [phone]  MD Arsalan Wolfe, MD Samira Bey Deb Gallardo, [address] [facility] [phone]  MD Dieter Greco MD Eual Hutchinson, MD Corine Bracken, MD Buckner Pronto, MD Sandra Llanos, JOSHUA Miller MD 1619 [facility]   [phone]  Claudetta Bastos, MD Colon Peeling, MD Darron Medico, MD   2102 [facility] [phone]  MD Frederick Shipman, [facility]  Lugene Sean, PAJimiC  Feliciagene Sean, [facility]  CLAIRE Hoff MD Georgetta Schillings, JOSHUA Urbina Black,  Miscellaneous:  [place] [phone]          Patient Education        Hives: Care Instructions  Your Care Instructions  Hives are raised, red, itchy patches of skin. They are also called wheals or welts. They usually have red borders and pale centers. Hives range in size from ¼ inch to 3 inches or more across. They may seem to move from place to place on the skin. Several hives may form a large area of raised, red skin. You can get hives after an insect sting, after taking medicine or eating certain foods, or because of infection or stress. Other causes include plants, things you breathe in, makeup, heat, cold, sunlight, and latex. You cannot spread hives to other people. Hives may last a few minutes or a few days, but a single spot may last less than 36 hours. Follow-up care is a key part of your treatment and safety. Be sure to make and go to all appointments, and call your doctor if you are having problems. It's also a good idea to know your test results and keep a list of the medicines you take. How can you care for yourself at home? · Avoid whatever you think may have caused your hives, such as a certain food or medicine. However, you may not know the cause. · Put a cool, wet towel on the area to relieve itching.   · Take an over-the-counter antihistamine, such as diphenhydramine (Benadryl), cetirizine (Zyrtec), or loratadine (Claritin), to help stop the hives and calm the itching. Read and follow directions on the label. These medicines can make you feel sleepy. Do not drive while using them. · Stay away from strong soaps, detergents, and chemicals. These can make itching worse. When should you call for help? Call 911 anytime you think you may need emergency care. For example, call if:    · You have symptoms of a severe allergic reaction. These may include:  ? Sudden raised, red areas (hives) all over your body. ? Swelling of the throat, mouth, lips, or tongue. ? Trouble breathing. ? Passing out (losing consciousness). Or you may feel very lightheaded or suddenly feel weak, confused, or restless.    Call your doctor now or seek immediate medical care if:    · You have symptoms of an allergic reaction, such as:  ? A rash or hives (raised, red areas on the skin). ? Itching. ? Swelling. ? Belly pain, nausea, or vomiting.     · You get hives after you start a new medicine.     · Hives have not gone away after 24 hours.    Watch closely for changes in your health, and be sure to contact your doctor if:    · You do not get better as expected. Where can you learn more? Go to http://rohith-phong.info/. Enter M805 in the search box to learn more about \"Hives: Care Instructions. \"  Current as of: September 23, 2018  Content Version: 11.9  © 2035-5917 [employer]. Care instructions adapted under license by [employer] (which disclaims liability or warranty for this information). If you have questions about a medical condition or this instruction, always ask your healthcare professional. [address] any warranty or liability for your use of this information.

## 2019-09-27 ENCOUNTER — OFFICE VISIT (OUTPATIENT)
Dept: FAMILY MEDICINE CLINIC | Age: 34
End: 2019-09-27

## 2019-09-27 VITALS
OXYGEN SATURATION: 99 % | HEART RATE: 68 BPM | SYSTOLIC BLOOD PRESSURE: 123 MMHG | DIASTOLIC BLOOD PRESSURE: 74 MMHG | WEIGHT: 247 LBS | TEMPERATURE: 97.7 F | HEIGHT: 66 IN | BODY MASS INDEX: 39.7 KG/M2 | RESPIRATION RATE: 16 BRPM

## 2019-09-27 DIAGNOSIS — D57.3 SICKLE CELL TRAIT (HCC): ICD-10-CM

## 2019-09-27 DIAGNOSIS — D50.9 IRON DEFICIENCY ANEMIA, UNSPECIFIED IRON DEFICIENCY ANEMIA TYPE: ICD-10-CM

## 2019-09-27 DIAGNOSIS — Z76.89 ENCOUNTER TO ESTABLISH CARE: Primary | ICD-10-CM

## 2019-09-27 DIAGNOSIS — E66.01 SEVERE OBESITY (HCC): ICD-10-CM

## 2019-09-27 RX ORDER — FERROUS SULFATE 325(65) MG
1 TABLET, DELAYED RELEASE (ENTERIC COATED) ORAL 2 TIMES DAILY
Refills: 2 | COMMUNITY
Start: 2019-09-13 | End: 2020-02-03 | Stop reason: SDUPTHER

## 2019-09-27 RX ORDER — NORETHINDRONE ACETATE AND ETHINYL ESTRADIOL 1MG-20(24)
1 KIT ORAL DAILY
COMMUNITY
Start: 2019-09-23 | End: 2020-07-22 | Stop reason: ALTCHOICE

## 2019-09-27 RX ORDER — LANOLIN ALCOHOL/MO/W.PET/CERES
1 CREAM (GRAM) TOPICAL DAILY
Refills: 6 | COMMUNITY
Start: 2019-09-13

## 2019-09-27 NOTE — PROGRESS NOTES
Chief Complaint   Patient presents with   [address] [facility] [phone]    9. Have you been to the ER, urgent care clinic since your last visit? Hospitalized since your last visit? No    2. Have you seen or consulted any other health care providers outside of the [address] [place] [address] since your last visit? Include any pap smears or colon screening.  No    Health Maintenance Due   Topic Date Due    DTaP/Tdap/Td series (1 - Tdap) 01/06/2004    PAP AKA CERVICAL CYTOLOGY  11/20/2015    Influenza Age 9 to Adult  08/01/2019

## 2019-09-27 NOTE — PATIENT INSTRUCTIONS
Learning About Low-Carbohydrate Diets for Weight Loss  What is a low-carbohydrate diet? Low-carb diets avoid foods that are high in carbohydrate. These high-carb foods include pasta, bread, rice, cereal, fruits, and starchy vegetables. Instead, these diets usually have you eat foods that are high in fat and protein. Many people lose weight quickly on a low-carb diet. But the early weight loss is water. People on this diet often gain the weight back after they start eating carbs again. Not all diet plans are safe or work well. A lot of the evidence shows that low-carb diets aren't healthy. That's because these diets often don't include healthy foods like fruits and vegetables. Losing weight safely means balancing protein, fat, and carbs with every meal and snack. And low-carb diets don't always provide the vitamins, minerals, and fiber you need. If you have a serious medical condition, talk to your doctor before you try any diet. These conditions include kidney disease, heart disease, type 2 diabetes, high cholesterol, and high blood pressure. If you are pregnant, it may not be safe for your baby if you are on a low-carb diet. How can you lose weight safely? You might have heard that a diet plan helped another person lose weight. But that doesn't mean that it will work for you. It is very hard to stay on a diet that includes lots of big changes in your eating habits. If you want to get to a healthy weight and stay there, making healthy lifestyle changes will often work better than dieting. These steps can help. · Make a plan for change. Work with your doctor to create a plan that is right for you. · See a dietitian. He or she can show you how to make healthy changes in your eating habits. · Manage stress. If you have a lot of stress in your life, it can be hard to focus on making healthy changes to your daily habits. · Track your food and activity.  You are likely to do better at losing weight if you keep track of what you eat and what you do. Follow-up care is a key part of your treatment and safety. Be sure to make and go to all appointments, and call your doctor if you are having problems. It's also a good idea to know your test results and keep a list of the medicines you take. Where can you learn more? Go to http://rohith-phong.info/. Enter A121 in the search box to learn more about \"Learning About Low-Carbohydrate Diets for Weight Loss. \"  Current as of: November 7, 2018  Content Version: 12.2  © 9557-4814 [employer]. Care instructions adapted under license by [employer] (which disclaims liability or warranty for this information). If you have questions about a medical condition or this instruction, always ask your healthcare professional. [address] any warranty or liability for your use of this information.

## 2019-09-27 NOTE — PROGRESS NOTES
HISTORY OF PRESENT ILLNESS  Jaylene Pratt is a 37 y.o. female. HPI  Patient comes in today to establish care. Had labs done on 9/11/19 with GYN - [facility]. Concerned about anemia.  hgb 9.2. Was started on iron supplement twice daily but states it increases her appetite and she has gained weight as a result. Patient states she has gained 12 pounds in last month. Started working out, eating healthier. Moods stable  Patient states her cycles are heavy 1 day, may have clots. Would last 7 days. No Known Allergies    Past Medical History:   Diagnosis Date    Bipolar disorder ([facility] 11/4/2013    Depression 3/29/2012    Endometriosis 11/20/2012    Obesity 2/23/2015    [facility] ([facility]     UTI (lower urinary tract infection) 7/30/2012    Vertigo        Past Surgical History:   Procedure Laterality Date    HX BREAST REDUCTION  2009    In 2010 patient had to have scar tissue removed.     HX CHOLECYSTECTOMY  1998    HX DILATION AND CURETTAGE      btl    HX GYN      Tubal ligation, D & C       Social History     Socioeconomic History    Marital status: SINGLE     Spouse name: Not on file    Number of children: Not on file    Years of education: Not on file    Highest education level: Not on file   Occupational History    Not on file   Social Needs    Financial resource strain: Not on file    Food insecurity:     Worry: Not on file     Inability: Not on file    Transportation needs:     Medical: Not on file     Non-medical: Not on file   Tobacco Use    Smoking status: Never Smoker    Smokeless tobacco: Never Used   Substance and Sexual Activity    Alcohol use: Yes     Comment: social/rare    Drug use: No    Sexual activity: Yes     Partners: Male     Birth control/protection: None, Surgical   Lifestyle    Physical activity:     Days per week: Not on file     Minutes per session: Not on file    Stress: Not on file   Relationships    Social connections:     Talks on phone: Not on file Gets together: Not on file     Attends Yazdanism service: Not on file     Active member of club or organization: Not on file     Attends meetings of clubs or organizations: Not on file     Relationship status: Not on file    Intimate partner violence:     Fear of current or ex partner: Not on file     Emotionally abused: Not on file     Physically abused: Not on file     Forced sexual activity: Not on file   Other Topics Concern    Not on file   Social History Narrative    3 children, 17y, 14y, 17y. Not currently working. Family History   Problem Relation Age of Onset    Breast Cancer Cousin     No Known Problems Mother     No Known Problems Father     No Known Problems Sister     No Known Problems Brother        Current Outpatient Medications   Medication Sig    ferrous sulfate (IRON) 325 mg (65 mg iron) EC tablet Take 1 Tab by mouth two (2) times a day.  BLISOVI 24 FE 1 mg-20 mcg (24)/75 mg (4) tab Take 1 Tab by mouth daily.  vitamin E, dl,tocopheryl acet, (VITAMIN E ACETATE) 400 unit cap capsule Take 1 Cap by mouth daily.  predniSONE (STERAPRED DS) 10 mg dose pack Standard 6 day taper    lidocaine (LIDODERM) 5 % APPLY PATCH TO THE AFFECTED AREA FOR 12 HOURS A DAY AND REMOVE FOR 12 HOURS A DAY. No current facility-administered medications for this visit. Review of Systems   Constitutional: Positive for malaise/fatigue. Negative for chills, diaphoresis, fever and weight loss. 12 pound weight gain in last 1 month   HENT: Negative for congestion, ear pain, sore throat and tinnitus. Eyes: Negative for blurred vision and double vision. Respiratory: Negative for cough, sputum production, shortness of breath and wheezing. Cardiovascular: Negative for chest pain, palpitations and leg swelling. Gastrointestinal: Negative for abdominal pain, blood in stool, constipation, diarrhea, nausea and vomiting.    Genitourinary: Negative for dysuria, flank pain, frequency, hematuria and urgency. Musculoskeletal: Negative for back pain, joint pain and myalgias. Skin: Negative. Neurological: Negative for dizziness, tingling, sensory change, speech change, focal weakness and headaches. Psychiatric/Behavioral: Negative for depression. The patient is not nervous/anxious and does not have insomnia. Vitals:    09/27/19 0851   BP: 123/74   Pulse: 68   Resp: 16   Temp: 97.7 °F (36.5 °C)   TempSrc: Oral   SpO2: 99%   Weight: 247 lb (112 kg)   Height: 5' 6\" (1.676 m)     Physical Exam   Constitutional: She is oriented to person, place, and time. Vital signs are normal. She appears well-developed and well-nourished. She is cooperative. HENT:   Right Ear: Hearing, tympanic membrane, external ear and ear canal normal.   Left Ear: Hearing, tympanic membrane, external ear and ear canal normal.   Nose: Nose normal.   Mouth/Throat: Uvula is midline, oropharynx is clear and moist and mucous membranes are normal.   Neck: No thyromegaly present. Cardiovascular: Normal rate, regular rhythm, S1 normal, S2 normal and normal heart sounds. No murmur heard. Pulses:       Dorsalis pedis pulses are 2+ on the right side, and 2+ on the left side. No edema noted   Pulmonary/Chest: Effort normal and breath sounds normal.   Abdominal: Soft. Normal appearance and bowel sounds are normal. There is no hepatosplenomegaly. There is no tenderness. Abd obese, exam limited by body habitus   Musculoskeletal: Normal range of motion. Lymphadenopathy:     She has no cervical adenopathy. Right: No supraclavicular adenopathy present. Left: No supraclavicular adenopathy present. Neurological: She is alert and oriented to person, place, and time. Skin: Skin is warm, dry and intact. Psychiatric: She has a normal mood and affect. Her speech is normal and behavior is normal. Thought content normal.   Vitals reviewed. ASSESSMENT and PLAN    ICD-10-CM ICD-9-CM    1.  Encounter to establish care Z76.89 V65.8    2. Iron deficiency anemia, unspecified iron deficiency anemia type D50.9 280.9    3. Severe obesity ([facility] E66.01 278.01    4. Sickle cell trait ([facility] D57.3 282.5      Encounter Diagnoses   Name Primary?  Encounter to establish care Yes    Iron deficiency anemia, unspecified iron deficiency anemia type     Severe obesity ([facility]     Sickle cell trait ([facility]      Orders Placed This Encounter    ferrous sulfate (IRON) 325 mg (65 mg iron) EC tablet    BLISOVI 24 FE 1 mg-20 mcg (24)/75 mg (4) tab    vitamin E, dl,tocopheryl acet, (VITAMIN E ACETATE) 400 unit cap capsule     Diagnoses and all orders for this visit:    1. Encounter to establish care  Brought labs from GYN office. CMP, A1c, TSH normal.  HIV, RPR, Hep B Ag negative. Hgb/Hct 9.2/29. 6. MCV, MCH, MCHC low, RDW high. Lipid panel with  and  (see scanned file attached)  Patient states she was not fasting. Will repeat in 6 weeks fasting. 2. Iron deficiency anemia, unspecified iron deficiency anemia type - patient to take PNV with iron. If no improvement, will refer to hematology. Patient to take OCPs to help control menses and blood loss. Will follow up in 6 weeks and check CBC and iron profile    3. Severe obesity ([facility]  -     - Provided nutritional and dietary counseling. Encouraged patient to drink water, avoid Liquid calories. Encouraged patient to make healthy dietary changes, encouraged patient to increase fruits/veggies, avoidance of fast foods, fried foods.  Encouraged patient to follow low carbohydrate diet,         - Discussed behavior modification. Discussed with patient to make small, achievable changes for better success with weight loss.  Aim for 1-2 pounds per week weight loss        - Encouraged patient to increase exercise.    -Patient to make small changes in diet and behavior, and to increase exercise. -    Patient to use [employer] rich  -     Follow up in 6 weeks    4.  Sickle cell trait [facility])    Patient to address immunizations at next visit. Follow-up and Dispositions    · Return in about 6 weeks (around 11/8/2019). lab results and schedule of future lab studies reviewed with patient  reviewed diet, exercise and weight control    I have reviewed the patient's allergies and made any necessary changes. Medical, procedural, social and family histories have been reviewed and updated as medically indicated. I have reconciled and/or revised patient medications in the EMR. I have discussed each diagnosis listed in this note with Pierre Renteria and/or their family. I have discussed treatment options and the risk/benefit analysis of those options, including safe use of medications and possible medication side effects. Through the use of shared decision making we have agreed to the above plan. The patient has received an after-visit summary and questions were answered concerning future plans. Priyanka Weems, ABIOLA-C    This note will not be viewable in [employer].

## 2019-11-05 ENCOUNTER — TELEPHONE (OUTPATIENT)
Dept: FAMILY MEDICINE CLINIC | Age: 34
End: 2019-11-05

## 2019-11-05 NOTE — TELEPHONE ENCOUNTER
Patient called stating that she has a form that needs a signature stating that she can preform her job duties. Patient can be reached at [phone].

## 2019-11-08 ENCOUNTER — OFFICE VISIT (OUTPATIENT)
Dept: FAMILY MEDICINE CLINIC | Age: 34
End: 2019-11-08

## 2019-11-08 VITALS
BODY MASS INDEX: 39.89 KG/M2 | WEIGHT: 248.2 LBS | HEART RATE: 74 BPM | RESPIRATION RATE: 16 BRPM | OXYGEN SATURATION: 100 % | DIASTOLIC BLOOD PRESSURE: 88 MMHG | TEMPERATURE: 96 F | HEIGHT: 66 IN | SYSTOLIC BLOOD PRESSURE: 128 MMHG

## 2019-11-08 DIAGNOSIS — R89.9 ABNORMAL LABORATORY TEST RESULT: Primary | ICD-10-CM

## 2019-11-08 DIAGNOSIS — E78.5 HYPERLIPIDEMIA, UNSPECIFIED HYPERLIPIDEMIA TYPE: ICD-10-CM

## 2019-11-08 DIAGNOSIS — D64.9 ANEMIA, UNSPECIFIED TYPE: ICD-10-CM

## 2019-11-08 NOTE — PROGRESS NOTES
HISTORY OF PRESENT ILLNESS  Diane Mario is a 37 y.o. female. HPI  Patient comes in today for follow up labs from employer  Employer was concerned about her Hgb 9.7 and cholesterol elevated at 248. Patient has hx of anemia. Labs are stable.  hgb from Sept 2019 was 9.2. Patient taking MVI with iron. Patient states she was not fasting for those labs, she had eaten [place] before labs. States she had been eating [place] every day prior to these labs. No Known Allergies    Past Medical History:   Diagnosis Date    Bipolar disorder ([facility] 11/4/2013    Depression 3/29/2012    Endometriosis 11/20/2012    Iron deficiency anemia 9/27/2019    Obesity 2/23/2015    Severe obesity ([facility] 9/27/2019    Sickle cell trait ([facility]     UTI (lower urinary tract infection) 7/30/2012    Vertigo        Past Surgical History:   Procedure Laterality Date    HX BREAST REDUCTION  2009    In 2010 patient had to have scar tissue removed.     HX CHOLECYSTECTOMY  1998    HX DILATION AND CURETTAGE      btl    HX GYN      Tubal ligation, D & C       Social History     Socioeconomic History    Marital status: SINGLE     Spouse name: Not on file    Number of children: Not on file    Years of education: Not on file    Highest education level: Not on file   Occupational History    Not on file   Social Needs    Financial resource strain: Not on file    Food insecurity:     Worry: Not on file     Inability: Not on file    Transportation needs:     Medical: Not on file     Non-medical: Not on file   Tobacco Use    Smoking status: Never Smoker    Smokeless tobacco: Never Used   Substance and Sexual Activity    Alcohol use: Yes     Comment: social/rare    Drug use: No    Sexual activity: Yes     Partners: Male     Birth control/protection: None, Surgical   Lifestyle    Physical activity:     Days per week: Not on file     Minutes per session: Not on file    Stress: Not on file   Relationships    Social connections: Talks on phone: Not on file     Gets together: Not on file     Attends Hindu service: Not on file     Active member of club or organization: Not on file     Attends meetings of clubs or organizations: Not on file     Relationship status: Not on file    Intimate partner violence:     Fear of current or ex partner: Not on file     Emotionally abused: Not on file     Physically abused: Not on file     Forced sexual activity: Not on file   Other Topics Concern    Not on file   Social History Narrative    3 children, 17y, 14y, 17y. Not currently working. Family History   Problem Relation Age of Onset    Breast Cancer Cousin     No Known Problems Mother     No Known Problems Father     No Known Problems Sister     No Known Problems Brother        Current Outpatient Medications   Medication Sig    PNV Combo No.47-Iron-FA #1-DHA 27 mg iron-1 mg -300 mg cap Take  by mouth.  ferrous sulfate (IRON) 325 mg (65 mg iron) EC tablet Take 1 Tab by mouth two (2) times a day.  BLISOVI 24 FE 1 mg-20 mcg (24)/75 mg (4) tab Take 1 Tab by mouth daily.  vitamin E, dl,tocopheryl acet, (VITAMIN E ACETATE) 400 unit cap capsule Take 1 Cap by mouth daily.  predniSONE (STERAPRED DS) 10 mg dose pack Standard 6 day taper    lidocaine (LIDODERM) 5 % APPLY PATCH TO THE AFFECTED AREA FOR 12 HOURS A DAY AND REMOVE FOR 12 HOURS A DAY. No current facility-administered medications for this visit. Review of Systems   Constitutional: Negative. Respiratory: Negative. Cardiovascular: Negative. Genitourinary: Negative. Neurological: Negative. Vitals:    11/08/19 0904 11/08/19 0945   BP: 144/88 128/88   Pulse: 74    Resp: 16    Temp: 96 °F (35.6 °C)    TempSrc: Oral    SpO2: 100%    Weight: 248 lb 3.2 oz (112.6 kg)    Height: 5' 6\" (1.676 m)      Physical Exam   Constitutional: She is oriented to person, place, and time. She appears well-developed and well-nourished. Cardiovascular: Normal rate. Pulmonary/Chest: Effort normal.   Neurological: She is alert and oriented to person, place, and time. Psychiatric: She has a normal mood and affect. Her behavior is normal. Judgment and thought content normal.       ASSESSMENT and PLAN    ICD-10-CM ICD-9-CM    1. Abnormal laboratory test result R89.9 796.4    2. Hyperlipidemia, unspecified hyperlipidemia type E78.5 272.4    3. Anemia, unspecified type D64.9 285.9      Encounter Diagnoses   Name Primary?  Abnormal laboratory test result Yes    Hyperlipidemia, unspecified hyperlipidemia type     Anemia, unspecified type      Orders Placed This Encounter    PNV Combo No.47-Iron-FA #1-DHA 27 mg iron-1 mg -300 mg cap     Diagnoses and all orders for this visit:    1. Abnormal laboratory test result - reviewed labs and provided letter to employer indicating she is able to work, no restrictions needed. 2. Hyperlipidemia, unspecified hyperlipidemia type - patient to return on 12/2/19 for lab only, fasting to come fasting    3. Anemia, unspecified type- patient to return on 12/2/19 for lab only  . Continue MVI, follow up with GYN as needed    On 12/2/19, will need following labs:  Lipid panel, anemia profile B, UA with micro          I have reviewed the patient's allergies and made any necessary changes. Medical, procedural, social and family histories have been reviewed and updated as medically indicated. I have reconciled and/or revised patient medications in the EMR. I have discussed each diagnosis listed in this note with Aj Dela Cruz and/or their family. I have discussed treatment options and the risk/benefit analysis of those options, including safe use of medications and possible medication side effects. Through the use of shared decision making we have agreed to the above plan. The patient has received an after-visit summary and questions were answered concerning future plans. Priyanka Weems, ABIOLA-C    This note will not be viewable in MyChart.

## 2019-11-08 NOTE — LETTER
NOTIFICATION RETURN TO WORK 
 
11/8/2019 9:29 AM 
 
Ms. Yolette Hernandez 
[address] [place] [id number] To Whom It May Concern: 
 
Aj Dela Cruz is currently under the care of [facility]. She was seen in the office today, 11/8/19 to review her lab results that were collected by her employer on 10/25/19. Her cholesterol numbers were elevated on labs, but patient was not fasting prior to collection of her blood. Non-fasting results do not provide an accurate reading for cholesterol levels. I am working with the patient on making sure she is following a low fat, low cholesterol diet and I will be monitoring her cholesterol labs. I have also reviewed her hemoglobin levels from labs collected by employer. She has a history of anemia and is being followed by myself and her gynecologist.  Her hemoglobin is higher than her previous reading in September 2019. The patient is cleared to work and her lab results should not interfere in her ability to work. There are no restrictions needed. If there are questions or concerns please have the patient contact our office. Sincerely, Zaina Park NP

## 2019-11-08 NOTE — PROGRESS NOTES
Chief Complaint   Patient presents with    Anemia     1. Have you been to the ER, urgent care clinic since your last visit? Hospitalized since your last visit? No    2. Have you seen or consulted any other health care providers outside of the [address] [place] [address] since your last visit? Include any pap smears or colon screening. No    Health Maintenance Due   Topic Date Due    DTaP/Tdap/Td series (1 - Tdap) 01/06/2004    PAP AKA CERVICAL CYTOLOGY  11/20/2015    Influenza Age 9 to Adult  08/01/2019   .

## 2019-12-30 ENCOUNTER — TELEPHONE (OUTPATIENT)
Dept: FAMILY MEDICINE CLINIC | Age: 34
End: 2019-12-30

## 2019-12-30 NOTE — TELEPHONE ENCOUNTER
Patient stopped by to do her labs, I told her that Naheed Rahman was not in to put the orders in . She would like to know when would be a good time to stop by.     Patient can be reached at [phone]

## 2019-12-30 NOTE — TELEPHONE ENCOUNTER
Message sent to patient per office note patient needs a lab only appointment that was due around 12/2/19. Patient notified she will need to be on schedule.

## 2020-01-22 ENCOUNTER — TELEPHONE (OUTPATIENT)
Dept: FAMILY MEDICINE CLINIC | Age: 35
End: 2020-01-22

## 2020-01-22 NOTE — TELEPHONE ENCOUNTER
Patient states she needs to come in for labs, but there are no orders in system.     MsLeandro Adelaida Orquidea   [phone]

## 2020-01-22 NOTE — TELEPHONE ENCOUNTER
Verified patient with two type of identifiers. Informed pt she would need to be on the schedule for lab only and per Rocio Veras pt needs On 12/2/19, will need following labs:  Lipid panel, anemia profile B, UA with micro. Pt to call back later today/tomorrow to try to schedule lab only appt.

## 2020-01-24 ENCOUNTER — TELEPHONE (OUTPATIENT)
Dept: FAMILY MEDICINE CLINIC | Age: 35
End: 2020-01-24

## 2020-01-24 NOTE — TELEPHONE ENCOUNTER
Verified patient with two type of identifiers. Informed pt would need appt to discuss stressors and pt states is just going to quit her job and does not need to worry about it right now. Pt schedule lab only for 1/29/20 at 11 AM for fasting labs.

## 2020-01-24 NOTE — TELEPHONE ENCOUNTER
----- Message from Miguel A Russo sent at 1/23/2020  4:42 PM EST -----  Regarding: NP Christine/Telephone  General Message/Vendor Calls    Caller's first and last name:      Reason for call:  Discuss getting days off from her job due to stress    Callback required yes/no and why:  Yes, to discuss taking days off.     Best contact number(s):  [phone]    Details to clarify the request:      Miguel A Russo

## 2020-01-29 ENCOUNTER — LAB ONLY (OUTPATIENT)
Dept: FAMILY MEDICINE CLINIC | Age: 35
End: 2020-01-29

## 2020-01-29 DIAGNOSIS — E78.5 HYPERLIPIDEMIA, UNSPECIFIED HYPERLIPIDEMIA TYPE: Primary | ICD-10-CM

## 2020-01-29 DIAGNOSIS — D64.9 ANEMIA, UNSPECIFIED TYPE: ICD-10-CM

## 2020-01-29 LAB
BACTERIA UA POCT, BACTPOCT: NORMAL
BILIRUB UR QL STRIP: NEGATIVE
CASTS UA POCT: NORMAL
CLUE CELLS, CLUEPOCT: NORMAL
CRYSTALS UA POCT, CRYSPOCT: NORMAL
EPITHELIAL CELLS POCT: NORMAL
GLUCOSE UR-MCNC: NEGATIVE MG/DL
KETONES P FAST UR STRIP-MCNC: NEGATIVE MG/DL
MUCUS UA POCT, MUCPOCT: NORMAL
PH UR STRIP: 5.5 [PH] (ref 4.6–8)
PROT UR QL STRIP: NORMAL
RBC UA POCT, RBCPOCT: NORMAL
SP GR UR STRIP: 1.02 (ref 1–1.03)
TRICH UA POCT, TRICHPOC: NORMAL
UA UROBILINOGEN AMB POC: NORMAL (ref 0.2–1)
URINALYSIS CLARITY POC: CLEAR
URINALYSIS COLOR POC: YELLOW
URINE BLOOD POC: NORMAL
URINE CULT COMMENT, POCT: NORMAL
URINE LEUKOCYTES POC: NEGATIVE
URINE NITRITES POC: NEGATIVE
WBC UA POCT, WBCPOCT: NORMAL
YEAST UA POCT, YEASTPOC: NORMAL

## 2020-01-29 NOTE — PROGRESS NOTES
Patient comes in today for lab only  Orders Placed This Encounter    LIPID PANEL    ANEMIA PROFILE B    AMB POC URINALYSIS DIP STICK AUTO W/ MICRO

## 2020-01-30 LAB
BASOPHILS # BLD AUTO: 0 X10E3/UL (ref 0–0.2)
BASOPHILS NFR BLD AUTO: 1 %
CHOLEST SERPL-MCNC: 228 MG/DL (ref 100–199)
EOSINOPHIL # BLD AUTO: 0.1 X10E3/UL (ref 0–0.4)
EOSINOPHIL NFR BLD AUTO: 1 %
ERYTHROCYTE [DISTWIDTH] IN BLOOD BY AUTOMATED COUNT: 16.8 % (ref 11.7–15.4)
FERRITIN SERPL-MCNC: 5 NG/ML (ref 15–150)
FOLATE SERPL-MCNC: 11.8 NG/ML
HCT VFR BLD AUTO: 32.8 % (ref 34–46.6)
HDLC SERPL-MCNC: 68 MG/DL
HGB BLD-MCNC: 10.5 G/DL (ref 11.1–15.9)
IMM GRANULOCYTES # BLD AUTO: 0 X10E3/UL (ref 0–0.1)
IMM GRANULOCYTES NFR BLD AUTO: 0 %
INTERPRETATION, 910389: NORMAL
IRON SATN MFR SERPL: 7 % (ref 15–55)
IRON SERPL-MCNC: 35 UG/DL (ref 27–159)
LDLC SERPL CALC-MCNC: 136 MG/DL (ref 0–99)
LYMPHOCYTES # BLD AUTO: 0.9 X10E3/UL (ref 0.7–3.1)
LYMPHOCYTES NFR BLD AUTO: 20 %
MCH RBC QN AUTO: 23.2 PG (ref 26.6–33)
MCHC RBC AUTO-ENTMCNC: 32 G/DL (ref 31.5–35.7)
MCV RBC AUTO: 72 FL (ref 79–97)
MONOCYTES # BLD AUTO: 0.3 X10E3/UL (ref 0.1–0.9)
MONOCYTES NFR BLD AUTO: 6 %
NEUTROPHILS # BLD AUTO: 3.3 X10E3/UL (ref 1.4–7)
NEUTROPHILS NFR BLD AUTO: 72 %
PLATELET # BLD AUTO: 334 X10E3/UL (ref 150–450)
RBC # BLD AUTO: 4.53 X10E6/UL (ref 3.77–5.28)
RETICS/RBC NFR AUTO: 1.6 % (ref 0.6–2.6)
TIBC SERPL-MCNC: 500 UG/DL (ref 250–450)
TRIGL SERPL-MCNC: 118 MG/DL (ref 0–149)
UIBC SERPL-MCNC: 465 UG/DL (ref 131–425)
VIT B12 SERPL-MCNC: 676 PG/ML (ref 232–1245)
VLDLC SERPL CALC-MCNC: 24 MG/DL (ref 5–40)
WBC # BLD AUTO: 4.6 X10E3/UL (ref 3.4–10.8)

## 2020-01-30 NOTE — PROGRESS NOTES
Your CBC, which looks at your white blood cells, red blood cells, and hemoglobin came back showing that you have anemia, and your iron profile shows that you are really low in iron. You need to take iron supplementation three times a day with meals. Have you taken iron supplements before and can you tolerate them? We can also refer you to hematologist for iron trandfusions. If we do start iron supplement, try to take with a source of vitamin C like orange juice to help with absorption. Avoid taking it with dairy as that decreases absorption. Major side effects of iron supplementation include constipation so I recommend you eat a lot of leafy green vegetables, increase your fiber, and take an over the counter stool softener. I always recommend adding citrucel/metamucil to your daily routine because not only does it prompt you to drink an extra glass of water but it helps keep your bowels more regular. Make sure you also follow up with GYN for heavy menses. LDL, or bad cholesterol, is elevated. This is the cholesterol that forms plaque in your arteries that leads to stroke and heart attack. Work on diet and exercise - Try to limit the amount of fried foods, fatty foods, butter, gravy, red meat, ice cream, cheese, and eggs in your diet, which are all high in cholesterol. We can continue to monitor this.

## 2020-01-31 ENCOUNTER — TELEPHONE (OUTPATIENT)
Dept: FAMILY MEDICINE CLINIC | Age: 35
End: 2020-01-31

## 2020-01-31 DIAGNOSIS — D50.9 IRON DEFICIENCY ANEMIA, UNSPECIFIED IRON DEFICIENCY ANEMIA TYPE: Primary | ICD-10-CM

## 2020-01-31 NOTE — TELEPHONE ENCOUNTER
See if she can take iron supplement.  We can also refer her to hematology for iron transfusions'   Follow up with GYN    RECOMMENDATIONS:  Your CBC, which looks at your white blood cells, red blood cells, and hemoglobin came back showing that you have anemia, and your iron profile shows that you are really low in iron. You need to take iron supplementation three times a day with meals. Have you taken iron supplements before and can you tolerate them? We can also refer you to hematologist for iron trandfusions. If we do start iron supplement, try to take with a source of vitamin C like orange juice to help with absorption. Avoid taking it with dairy as that decreases absorption. Major side effects of iron supplementation include constipation so I recommend you eat a lot of leafy green vegetables, increase your fiber, and take an over the counter stool softener. I always recommend adding citrucel/metamucil to your daily routine because not only does it prompt you to drink an extra glass of water but it helps keep your bowels more regular. Make sure you also follow up with GYN for heavy menses.     LDL, or bad cholesterol, is elevated. This is the cholesterol that forms plaque in your arteries that leads to stroke and heart attack. Work on diet and exercise - Try to limit the amount of fried foods, fatty foods, butter, gravy, red meat, ice cream, cheese, and eggs in your diet, which are all high in cholesterol. We can continue to monitor this.

## 2020-02-03 RX ORDER — FERROUS SULFATE 325(65) MG
325 TABLET, DELAYED RELEASE (ENTERIC COATED) ORAL
Qty: 270 TAB | Refills: 3 | Status: SHIPPED | OUTPATIENT
Start: 2020-02-03 | End: 2020-07-22 | Stop reason: ALTCHOICE

## 2020-02-03 NOTE — TELEPHONE ENCOUNTER
Verified patient with two type of identifiers. Informed pt of lab results. Pt verbalized understanding. Pt states would like to try medication first. Pt states will contact GYN.

## 2020-02-04 NOTE — TELEPHONE ENCOUNTER
Orders Placed This Encounter    ferrous sulfate (IRON) 325 mg (65 mg iron) EC tablet     Sig: Take 1 Tab by mouth three (3) times daily (with meals).      Dispense:  270 Tab     Refill:  3

## 2020-06-25 ENCOUNTER — TELEPHONE (OUTPATIENT)
Dept: FAMILY MEDICINE CLINIC | Age: 35
End: 2020-06-25

## 2020-06-25 NOTE — TELEPHONE ENCOUNTER
Verified patient with two type of identifiers. Pt scheduled for  Wednesday, July 22, 2020 02:00 PM. Pt verbalized understanding.

## 2020-06-25 NOTE — TELEPHONE ENCOUNTER
----- Message from Candelaria Hutchinson sent at 2020  7:57 AM EDT -----  Regarding: CECILIA/NP/TELEPHONE  Contact: [phone]  General Message/Vendor Call      Patient's first and last name:Yolette VAZQUEZ   : 1985  ID numbers: #[id number]    Caller's first and last name:  Leora Franco   Reason for call: Discuss wt loss issues  Callback required yes/no and why: Yes  Best contact number(s): [phone]  Details to clarify the request: Patient would like to schedule an appt for wt loss issues. Patient stated this is causing her to stress and to become very depressed.

## 2020-07-22 ENCOUNTER — OFFICE VISIT (OUTPATIENT)
Dept: FAMILY MEDICINE CLINIC | Age: 35
End: 2020-07-22

## 2020-07-22 VITALS
WEIGHT: 243.8 LBS | BODY MASS INDEX: 39.18 KG/M2 | SYSTOLIC BLOOD PRESSURE: 129 MMHG | OXYGEN SATURATION: 99 % | RESPIRATION RATE: 14 BRPM | HEIGHT: 66 IN | DIASTOLIC BLOOD PRESSURE: 80 MMHG | TEMPERATURE: 98.1 F | HEART RATE: 69 BPM

## 2020-07-22 DIAGNOSIS — D50.9 IRON DEFICIENCY ANEMIA, UNSPECIFIED IRON DEFICIENCY ANEMIA TYPE: ICD-10-CM

## 2020-07-22 DIAGNOSIS — E66.01 SEVERE OBESITY (HCC): Primary | ICD-10-CM

## 2020-07-22 RX ORDER — PHENTERMINE HYDROCHLORIDE 37.5 MG/1
1 TABLET ORAL DAILY
COMMUNITY
Start: 2020-06-29 | End: 2020-07-30 | Stop reason: SDUPTHER

## 2020-07-22 RX ORDER — ASCORBIC ACID 250 MG
250 TABLET ORAL DAILY
COMMUNITY

## 2020-07-22 RX ORDER — PHENTERMINE HYDROCHLORIDE 15 MG/1
1 CAPSULE ORAL DAILY
COMMUNITY
Start: 2020-06-22 | End: 2020-07-22 | Stop reason: ALTCHOICE

## 2020-07-22 RX ORDER — BISMUTH SUBSALICYLATE 262 MG
1 TABLET,CHEWABLE ORAL DAILY
COMMUNITY

## 2020-07-22 NOTE — PATIENT INSTRUCTIONS
Learning About Low-Carbohydrate Diets  What is a low-carbohydrate diet? A low-carbohydrate (or \"low-carb\") diet limits foods and drinks that have carbohydrates. This includes grains, fruits, milk and yogurt, and starchy vegetables like potatoes, beans, and corn. It also avoids foods and drinks that have added sugar. Instead, low-carb diets include foods that are high in protein and fat. Why might you follow a low-carb diet? Low-carb diets may be used for a variety of reasons, such as for weight loss. People who have diabetes may use a low-carb diet to help manage their blood sugar levels. What should you do before you start the diet? Talk to your doctor before you try any diet. This is even more important if you have health problems like kidney disease, heart disease, or diabetes. Your doctor may suggest that you meet with a registered dietitian. A dietitian can help you make an eating plan that works for you. What foods do you eat on a low-carb diet? On a low-carb diet, you choose foods that are high in protein and fat. Examples of these are:  · Meat, poultry, and fish. · Eggs. · Nuts, such as walnuts, pecans, almonds, and peanuts. · Peanut butter and other nut butters. · Tofu. · Avocado. · [place]. · Non-starchy vegetables like broccoli, cauliflower, green beans, mushrooms, peppers, lettuce, and spinach. · Unsweetened non-dairy milks like almond milk and coconut milk. · Cheese, cottage cheese, and cream cheese. Current as of: August 22, 2019               Content Version: 12.5  © 5270-6332 [employer]. Care instructions adapted under license by [employer] (which disclaims liability or warranty for this information). If you have questions about a medical condition or this instruction, always ask your healthcare professional. [place] [address] any warranty or liability for your use of this information.          Learning About Low-Carbohydrate Foods  What foods are low in carbohydrate? The foods you eat contain nutrients, such as vitamins and minerals. Carbohydrate is a nutrient. Your body needs the right amount to stay healthy and work as it should. You can use the list below to help you make choices about which foods to eat. Some foods that are lower in carbohydrate include:  Dairy and dairy alternatives  · Cheese  · Cottage cheese  · Cream cheese  · Nut milk (unsweetened)  · Soy milk (unsweetened)  · Yogurt (Greek, plain)  Fruits  · Avocado  · [employer] and other protein foods  · Almonds  · Beef  · Chicken  · Cod  · Eggs  · Halibut  · Peanut butter and other nut butters  · Pistachios  · Pork  · Pumpkin seeds  · Tofu  · Trout  · [place]  · [place] ([facility] [place])  · Walnuts  Vegetables  · Broccoli  · Carrots  · Cauliflower  · Green beans  · Mushrooms  · Peppers  · Salad greens  · Spinach  · Tomatoes  Work with your doctor to find out how much of this nutrient you need. Depending on your health, you may need more or less of it in your diet. Where can you learn more? Go to http://rohith-phong.info/  Enter C470 in the search box to learn more about \"Learning About Low-Carbohydrate Foods. \"  Current as of: August 22, 2019               Content Version: 12.5  © 1590-0867 [employer]. Care instructions adapted under license by [employer] (which disclaims liability or warranty for this information). If you have questions about a medical condition or this instruction, always ask your healthcare professional. [place] [address] any warranty or liability for your use of this information.

## 2020-07-22 NOTE — PROGRESS NOTES
Chief Complaint   Patient presents with    Weight Management     Pt has been on phentermine. Pt states went to weight loss clinic \"[place]where in [place]. \" Pt states insurance did not cover and was over $300. Pt was 256-260 lb. 1. Have you been to the ER, urgent care clinic since your last visit? Hospitalized since your last visit? No    2. Have you seen or consulted any other health care providers outside of the [address] [place] [address] since your last visit? Include any pap smears or colon screening.  No    Health Maintenance Due   Topic Date Due    DTaP/Tdap/Td series (1 - Tdap) 01/06/2004

## 2020-07-22 NOTE — PROGRESS NOTES
HISTORY OF PRESENT ILLNESS  Maco Kilgore is a 38 y.o. female. HPI  Patient comes in today for weight management visit. Weight Metrics 7/22/2020 11/8/2019 9/27/2019 6/30/2019 11/15/2018 11/12/2018 5/13/2018   Weight 243 lb 12.8 oz 248 lb 3.2 oz 247 lb 245 lb 6 oz 262 lb 262 lb 12.6 oz 244 lb 7.8 oz   BMI 39.35 kg/m2 40.06 kg/m2 39.87 kg/m2 39.6 kg/m2 42.29 kg/m2 42.42 kg/m2 39.46 kg/m2   went to weight management center in [place]. Went for 1 month. States insurance did not cover and cost $300. Given B12, phentermine, and a pamphlet to read. They did blood work, not sure if [place] did an EKG  States she does feel a little better. She is active. Thinks with COVID, found a stressor in weight. Eating healthier, smaller portions, drinking water or green tea. Tries to eat salad, grilled meats. Tries to avoid fried foods. Did not have chest pains, palpitations, insomnia with phentermine. She is walking 4-6 miles daily. She was above 260 pounds couple months ago. She has not taken phentermine in 1 week, states she had reservations about the weight loss facility and did not want to rely on a pill to lose weight. Wants to do on her own with diet and exercise  No Known Allergies    Past Medical History:   Diagnosis Date    Bipolar disorder ([address] 11/4/2013    Depression 3/29/2012    Endometriosis 11/20/2012    Iron deficiency anemia 9/27/2019    Obesity 2/23/2015    Severe obesity ([address] 9/27/2019    S[facility] trait [facility]     UTI (lower urinary tract infection) 7/30/2012    Vertigo        Past Surgical History:   Procedure Laterality Date    HX BREAST REDUCTION  2009    In 2010 patient had to have scar tissue removed.     HX CHOLECYSTECTOMY  1998    HX DILATION AND CURETTAGE      btl    HX GYN      Tubal ligation, D & C       Social History     Socioeconomic History    Marital status: SINGLE     Spouse name: Not on file    Number of children: Not on file    Years of education: Not on file  Highest education level: Not on file   Occupational History    Not on file   Social Needs    Financial resource strain: Not on file    Food insecurity     Worry: Not on file     Inability: Not on file    Transportation needs     Medical: Not on file     Non-medical: Not on file   Tobacco Use    Smoking status: Never Smoker    Smokeless tobacco: Never Used   Substance and Sexual Activity    Alcohol use: Yes     Comment: social/rare    Drug use: No    Sexual activity: Yes     Partners: Male     Birth control/protection: None, Surgical   Lifestyle    Physical activity     Days per week: Not on file     Minutes per session: Not on file    Stress: Not on file   Relationships    Social connections     Talks on phone: Not on file     Gets together: Not on file     Attends Jehovah's witness service: Not on file     Active member of club or organization: Not on file     Attends meetings of clubs or organizations: Not on file     Relationship status: Not on file    Intimate partner violence     Fear of current or ex partner: Not on file     Emotionally abused: Not on file     Physically abused: Not on file     Forced sexual activity: Not on file   Other Topics Concern    Not on file   Social History Narrative    3 children, 17y, 14y, 17y. Not currently working. Family History   Problem Relation Age of Onset    Breast Cancer Cousin     No Known Problems Mother     No Known Problems Father     No Known Problems Sister     No Known Problems Brother        Current Outpatient Medications   Medication Sig    phentermine (ADIPEX-P) 37.5 mg tablet Take 1 Tab by mouth daily.  multivitamin (ONE A DAY) tablet Take 1 Tab by mouth daily.  ascorbic acid, vitamin C, (Vitamin C) 250 mg tablet Take  by mouth.  vitamin E, dl,tocopheryl acet, (VITAMIN E ACETATE) 400 unit cap capsule Take 1 Cap by mouth daily.  phentermine (ADIPEX_P) 15 mg capsule Take 1 Cap by mouth daily.     ferrous sulfate (IRON) 325 mg (65 mg iron) EC tablet Take 1 Tab by mouth three (3) times daily (with meals). (Patient not taking: Reported on 7/22/2020)    PNV Combo No.47-Iron-FA #1-DHA 27 mg iron-1 mg -300 mg cap Take  by mouth.  BLISOVI 24 FE 1 mg-20 mcg (24)/75 mg (4) tab Take 1 Tab by mouth daily.  lidocaine (LIDODERM) 5 % APPLY PATCH TO THE AFFECTED AREA FOR 12 HOURS A DAY AND REMOVE FOR 12 HOURS A DAY. No current facility-administered medications for this visit. Review of Systems   Constitutional: Positive for weight loss. Negative for chills and fever. Respiratory: Negative. Cardiovascular: Negative. Gastrointestinal: Negative. Genitourinary: Negative. Neurological: Negative. Psychiatric/Behavioral: Negative. Vitals:    07/22/20 1405   BP: 129/80   Pulse: 69   Resp: 14   Temp: 98.1 °F (36.7 °C)   TempSrc: Oral   SpO2: 99%   Weight: 243 lb 12.8 oz (110.6 kg)   Height: 5' 6\" (1.676 m)     Physical Exam  Vitals signs reviewed. Constitutional:       Appearance: Normal appearance. She is well-developed and well-groomed. She is obese. Cardiovascular:      Rate and Rhythm: Normal rate. Pulmonary:      Effort: Pulmonary effort is normal.   Neurological:      Mental Status: She is alert and oriented to person, place, and time. Psychiatric:         Behavior: Behavior is cooperative. ASSESSMENT and PLAN    ICD-10-CM ICD-9-CM    1. Severe obesity ([facility]  E66.01 278.01    2. Iron deficiency anemia, unspecified iron deficiency anemia type  D50.9 280.9      Encounter Diagnoses   Name Primary?  Severe obesity (HCC) Yes    Iron deficiency anemia, unspecified iron deficiency anemia type      Orders Placed This Encounter    DISCONTD: phentermine (ADIPEX_P) 15 mg capsule    phentermine (ADIPEX-P) 37.5 mg tablet    multivitamin (ONE A DAY) tablet    ascorbic acid, vitamin C, (Vitamin C) 250 mg tablet     Diagnoses and all orders for this visit:    1. Severe obesity ([address] - patient to hold phentermine. Work on diet and exercise. Will do weight check in 1 month. - Provided nutritional and dietary counseling. Encouraged patient to drink water, avoid Liquid calories. Encouraged patient to make healthy dietary changes, encouraged patient to increase fruits/veggies, avoidance of fast foods, fried foods.  Encouraged patient to follow low carbohydrate diet,         - Discussed behavior modification. Discussed with patient to make small, achievable changes for better success with weight loss.  Aim for 1-2 pounds per week weight loss        - Encouraged patient to increase exercise.    -Patient to make small changes in diet and behavior, and to increase exercise. 2. Iron deficiency anemia, unspecified iron deficiency anemia type - patient is on cycle, wishes to defer labs until next visit in 1 month. Follow-up and Dispositions    · Return in about 1 month (around 8/22/2020). I have reviewed the patient's allergies and made any necessary changes. Medical, procedural, social and family histories have been reviewed and updated as medically indicated. I have reconciled and/or revised patient medications in the EMR. I have discussed each diagnosis listed in this note with Diane Mario and/or their family. I have discussed treatment options and the risk/benefit analysis of those options, including safe use of medications and possible medication side effects. Through the use of shared decision making we have agreed to the above plan. The patient has received an after-visit summary and questions were answered concerning future plans. Priyanka Weems, NETO    This note will not be viewable in [employer].

## 2020-07-30 DIAGNOSIS — E66.01 SEVERE OBESITY (HCC): Primary | ICD-10-CM

## 2020-07-30 RX ORDER — PHENTERMINE HYDROCHLORIDE 37.5 MG/1
37.5 TABLET ORAL DAILY
Qty: 30 TAB | Refills: 0 | Status: SHIPPED | OUTPATIENT
Start: 2020-07-30 | End: 2020-09-17

## 2020-07-30 NOTE — TELEPHONE ENCOUNTER
Last OV: 7/22/20    Requested Prescriptions     Pending Prescriptions Disp Refills    phentermine (ADIPEX-P) 37.5 mg tablet 30 Tab 0     Sig: Take 1 Tab by mouth daily.  Max Daily Amount: 37.5 mg.

## 2020-08-18 ENCOUNTER — OFFICE VISIT (OUTPATIENT)
Dept: FAMILY MEDICINE CLINIC | Age: 35
End: 2020-08-18
Payer: MEDICAID

## 2020-08-18 VITALS
BODY MASS INDEX: 38.38 KG/M2 | WEIGHT: 238.8 LBS | TEMPERATURE: 98 F | DIASTOLIC BLOOD PRESSURE: 78 MMHG | SYSTOLIC BLOOD PRESSURE: 129 MMHG | OXYGEN SATURATION: 100 % | RESPIRATION RATE: 12 BRPM | HEART RATE: 61 BPM | HEIGHT: 66 IN

## 2020-08-18 DIAGNOSIS — Z11.4 ENCOUNTER FOR SCREENING FOR HIV: ICD-10-CM

## 2020-08-18 DIAGNOSIS — Z11.3 SCREEN FOR STD (SEXUALLY TRANSMITTED DISEASE): ICD-10-CM

## 2020-08-18 DIAGNOSIS — E78.5 HYPERLIPIDEMIA, UNSPECIFIED HYPERLIPIDEMIA TYPE: ICD-10-CM

## 2020-08-18 DIAGNOSIS — E66.01 SEVERE OBESITY (HCC): Primary | ICD-10-CM

## 2020-08-18 DIAGNOSIS — J35.8 TONSIL STONE: ICD-10-CM

## 2020-08-18 DIAGNOSIS — R60.9 EDEMA, UNSPECIFIED TYPE: ICD-10-CM

## 2020-08-18 LAB — HBA1C MFR BLD HPLC: 5.3 %

## 2020-08-18 PROCEDURE — 99213 OFFICE O/P EST LOW 20 MIN: CPT | Performed by: NURSE PRACTITIONER

## 2020-08-18 PROCEDURE — 83036 HEMOGLOBIN GLYCOSYLATED A1C: CPT | Performed by: NURSE PRACTITIONER

## 2020-08-18 RX ORDER — BUMETANIDE 1 MG/1
1 TABLET ORAL
Qty: 30 TAB | Refills: 1 | Status: SHIPPED | OUTPATIENT
Start: 2020-08-18 | End: 2020-11-18 | Stop reason: SDUPTHER

## 2020-08-18 NOTE — PROGRESS NOTES
HISTORY OF PRESENT ILLNESS  Ginna Frank is a 38 y.o. female. HPI Patient comes in today for follow up weight management. Has been walking, eating better, trying to eater smaller, more consistent meals. Is not craving bad foods. Down 5 pounds in 1 month. 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 Concerned about taking phentermine with new job and drug screening. Taking a MVI and vitamin C, she is currently on menses today. Tonsillar stones, states causes bad breath. Was able to extract one stone today in office. Not interested in seeing ENT. Discussed oral hygiene with patient. No Known Allergies    Past Medical History:   Diagnosis Date    Bipolar disorder ([facility] 11/4/2013    Depression 3/29/2012    Endometriosis 11/20/2012    Iron deficiency anemia 9/27/2019    Obesity 2/23/2015    Severe obesity ([address] 9/27/2019    Sickle cell trait ([facility]     UTI (lower urinary tract infection) 7/30/2012    Vertigo        Past Surgical History:   Procedure Laterality Date    HX BREAST REDUCTION  2009    In 2010 patient had to have scar tissue removed.     HX CHOLECYSTECTOMY  1998    HX DILATION AND CURETTAGE      btl    HX GYN      Tubal ligation, D & C       Social History     Socioeconomic History    Marital status: SINGLE     Spouse name: Not on file    Number of children: Not on file    Years of education: Not on file    Highest education level: Not on file   Occupational History    Not on file   Social Needs    Financial resource strain: Not on file    Food insecurity     Worry: Not on file     Inability: Not on file    Transportation needs     Medical: Not on file     Non-medical: Not on file   Tobacco Use    Smoking status: Never Smoker    Smokeless tobacco: Never Used   Substance and Sexual Activity    Alcohol use: Yes     Comment: social/rare    Drug use: No    Sexual activity: Yes     Partners: Male     Birth control/protection: None, Surgical   Lifestyle    Physical activity     Days per week: Not on file     Minutes per session: Not on file    Stress: Not on file   Relationships    Social connections     Talks on phone: Not on file     Gets together: Not on file     Attends Jehovah's witness service: Not on file     Active member of club or organization: Not on file     Attends meetings of clubs or organizations: Not on file     Relationship status: Not on file    Intimate partner violence     Fear of current or ex partner: Not on file     Emotionally abused: Not on file     Physically abused: Not on file     Forced sexual activity: Not on file   Other Topics Concern    Not on file   Social History Narrative    3 children, 17y, 14y, 17y. Not currently working. Family History   Problem Relation Age of Onset    Breast Cancer Cousin     No Known Problems Mother     No Known Problems Father     No Known Problems Sister     No Known Problems Brother        Current Outpatient Medications   Medication Sig    phentermine (ADIPEX-P) 37.5 mg tablet Take 1 Tab by mouth daily. Max Daily Amount: 37.5 mg. (Patient taking differently: Take 37.5 mg by mouth daily. Every other ; 2-3 times weekly.)    multivitamin (ONE A DAY) tablet Take 1 Tab by mouth daily.  ascorbic acid, vitamin C, (Vitamin C) 250 mg tablet Take  by mouth.  vitamin E, dl,tocopheryl acet, (VITAMIN E ACETATE) 400 unit cap capsule Take 1 Cap by mouth daily. No current facility-administered medications for this visit. Review of Systems   Constitutional: Positive for weight loss. Negative for chills and fever. HENT:        Tonsillar stones   Respiratory: Negative. Cardiovascular: Negative. Gastrointestinal: Negative. Genitourinary: Negative. Neurological: Negative. Psychiatric/Behavioral: Negative. Vitals:    08/18/20 1517   BP: 129/78   Pulse: 61   Resp: 12   Temp: 98 °F (36.7 °C)   TempSrc: Oral   SpO2: 100%   Weight: 238 lb 12.8 oz (108.3 kg)   Height: 5' 6\" (1.676 m)       Physical Exam  Vitals signs reviewed. Constitutional:       Appearance: Normal appearance. She is well-developed and well-groomed. She is obese. HENT:      Mouth/Throat:      Lips: Pink. Mouth: Mucous membranes are moist.      Pharynx: Oropharynx is clear. Tonsils: Tonsillar exudate (tonsillar stones on right) present. Cardiovascular:      Rate and Rhythm: Normal rate. Pulmonary:      Effort: Pulmonary effort is normal.   Neurological:      Mental Status: She is alert and oriented to person, place, and time. Psychiatric:         Behavior: Behavior is cooperative. ASSESSMENT and PLAN    ICD-10-CM ICD-9-CM    1. Severe obesity (HCC)  E66.01 278.01 VITAMIN D, 25 HYDROXY      AMB POC HEMOGLOBIN A1C   2. Hyperlipidemia, unspecified hyperlipidemia type  E78.5 272.4 LIPID PANEL      METABOLIC PANEL, COMPREHENSIVE   3. Edema, unspecified type  R60.9 782.3 bumetanide (BUMEX) 1 mg tablet   4. Encounter for screening for HIV  Z11.4 V73.89 HIV 1/2 AG/AB, 4TH GENERATION,W RFLX CONFIRM   5. Screen for STD (sexually transmitted disease)  Z11.3 V74.5 RPR W/REFLEX TITER AND TREPONEMA ABS      CHLAMYDIA/GC PCR   6. Tonsil stone  J35.8 474.8      Encounter Diagnoses   Name Primary?     Severe obesity (HCC) Yes    Hyperlipidemia, unspecified hyperlipidemia type     Edema, unspecified type     Encounter for screening for HIV     Screen for STD (sexually transmitted disease)     Tonsil stone      Orders Placed This Encounter    CHLAMYDIA/GC PCR    LIPID PANEL    METABOLIC PANEL, COMPREHENSIVE    HIV 1/2 AG/AB, 4TH GENERATION,W RFLX CONFIRM    RPR W/REFLEX TITER AND TREPONEMA ABS    VITAMIN D, 25 HYDROXY    AMB POC HEMOGLOBIN A1C    bumetanide (BUMEX) 1 mg tablet     Diagnoses and all orders for this visit:    1. Severe obesity ([address]  -     VITAMIN D, 25 HYDROXY  -     AMB POC HEMOGLOBIN A1C  -     - Provided nutritional and dietary counseling. Encouraged patient to drink water, avoid Liquid calories. Encouraged patient to make healthy dietary changes, encouraged patient to increase fruits/veggies, avoidance of fast foods, fried foods.  Encouraged patient to follow low carbohydrate diet,         - Discussed behavior modification. Discussed with patient to make small, achievable changes for better success with weight loss.  Aim for 1-2 pounds per week weight loss        - Encouraged patient to increase exercise.    -Patient to make small changes in diet and behavior, and to increase exercise. 2. Hyperlipidemia, unspecified hyperlipidemia type  -     LIPID PANEL  -     METABOLIC PANEL, COMPREHENSIVE    3. Edema, unspecified type  -     bumetanide (BUMEX) 1 mg tablet; Take 1 Tab by mouth daily as needed (swelling). 4. Encounter for screening for HIV  -     HIV 1/2 AG/AB, 4TH GENERATION,W RFLX CONFIRM    5. Screen for STD (sexually transmitted disease)  -     RPR W/REFLEX TITER AND TREPONEMA ABS  -     CHLAMYDIA/GC PCR    6. Tonsil stone - discussed oral hygiene with patient. Patient declines referral to ENT at this time. Follow-up and Dispositions    · Return in about 3 months (around 11/18/2020), or if symptoms worsen or fail to improve.       lab results and schedule of future lab studies reviewed with patient    I have reviewed the patient's allergies and made any necessary changes. Medical, procedural, social and family histories have been reviewed and updated as medically indicated. I have reconciled and/or revised patient medications in the EMR. I have discussed each diagnosis listed in this note with Sincere Davis and/or their family.  I have discussed treatment options and the risk/benefit analysis of those options, including safe use of medications and possible medication side effects. Through the use of shared decision making we have agreed to the above plan. The patient has received an after-visit summary and questions were answered concerning future plans. NETO Mak    This note will not be viewable in [employer].

## 2020-08-18 NOTE — LETTER
8/18/2020 4:54 PM 
 
Ms. Yolette Hernandez 
[address] [address] To Whom It May Concern, Ozzie Pan has been prescribed phentermine to help with weight reduction. This medication would cause a positive amphetamine result on a drug screen. She in being monitored regularly for therapeutic effect and continued treatment. If you have any questions, please contact the office at [phone]. Sincerely, Yecenia Pratt NP

## 2020-08-18 NOTE — PROGRESS NOTES
Chief Complaint   Patient presents with    Weight Management     Pt concerned that she keeps holding water weight. Pt has not been taking phentermine daily due to fear of jobs drug testing her. Pt would like letter that states she is on phentermine. 1. Have you been to the ER, urgent care clinic since your last visit? Hospitalized since your last visit? No    2. Have you seen or consulted any other health care providers outside of the [address] [place] [address] since your last visit? Include any pap smears or colon screening.  No    Health Maintenance Due   Topic Date Due    DTaP/Tdap/Td series (1 - Tdap) 01/06/2004    Influenza Age 5 to Adult  08/01/2020

## 2020-08-22 LAB
25(OH)D3+25(OH)D2 SERPL-MCNC: 26.3 NG/ML (ref 30–100)
ALBUMIN SERPL-MCNC: 4.8 G/DL (ref 3.8–4.8)
ALBUMIN/GLOB SERPL: 1.8 {RATIO} (ref 1.2–2.2)
ALP SERPL-CCNC: 58 IU/L (ref 39–117)
ALT SERPL-CCNC: 13 IU/L (ref 0–32)
AST SERPL-CCNC: 19 IU/L (ref 0–40)
BILIRUB SERPL-MCNC: 0.5 MG/DL (ref 0–1.2)
BUN SERPL-MCNC: 10 MG/DL (ref 6–20)
BUN/CREAT SERPL: 10 (ref 9–23)
C TRACH RRNA SPEC QL NAA+PROBE: NEGATIVE
CALCIUM SERPL-MCNC: 9.9 MG/DL (ref 8.7–10.2)
CHLORIDE SERPL-SCNC: 99 MMOL/L (ref 96–106)
CHOLEST SERPL-MCNC: 247 MG/DL (ref 100–199)
CO2 SERPL-SCNC: 22 MMOL/L (ref 20–29)
CREAT SERPL-MCNC: 0.98 MG/DL (ref 0.57–1)
GLOBULIN SER CALC-MCNC: 2.7 G/DL (ref 1.5–4.5)
GLUCOSE SERPL-MCNC: 85 MG/DL (ref 65–99)
HDLC SERPL-MCNC: 54 MG/DL
HIV 1+2 AB+HIV1 P24 AG SERPL QL IA: NON REACTIVE
INTERPRETATION, 910389: NORMAL
LDLC SERPL CALC-MCNC: 167 MG/DL (ref 0–99)
N GONORRHOEA RRNA SPEC QL NAA+PROBE: NEGATIVE
POTASSIUM SERPL-SCNC: 4.4 MMOL/L (ref 3.5–5.2)
PROT SERPL-MCNC: 7.5 G/DL (ref 6–8.5)
RPR SER QL: NON REACTIVE
SODIUM SERPL-SCNC: 138 MMOL/L (ref 134–144)
TRIGL SERPL-MCNC: 129 MG/DL (ref 0–149)
VLDLC SERPL CALC-MCNC: 26 MG/DL (ref 5–40)

## 2020-08-29 NOTE — PROGRESS NOTES
RECOMMENDATIONS:  Diabetes and thyroid screen negative. Liver and kidney function normal.  HIV, Syphilis and STD screening negative. Vitamin D is a little low. You can take an over-the-counter Vitamin D supplement 1,000 units daily. LDL, or bad cholesterol, is elevated. This is the cholesterol that forms plaque in your arteries that leads to stroke and heart attack. This has gone up 31` points since last reading. Hopefully with some diet and exercise changes and weight loss, this number will improve. Decrease intake of beef, lamb, pork (sausage, denney), foods with lard/shortening, dairy products with whole milk, saturated oils (coconut and palm oil), fried foods, desserts, sugary drinks, and packaged goods. Increase intake of fiber (oats, samy seeds, flaxseeds, barley, quinoa), fatty fish (salmon, sardines, trout, mackerel, tuna), lean meats (pork tenderloin, turkey, sirloin steak), nuts (especially almonds and walnuts), seeds, beans, vegetables and dark leafy greens (spinach/kale), skin of fruits, berries, olive oil, avocados/avocado oil, canola oil. Eat leaner meats, low-fat/fat-free dairy products, and yogurts. Plan to recheck in 3-6 months. Make sure you are fasting (no food, only water) for 8-12  hours before your appointment.

## 2020-09-02 ENCOUNTER — TELEPHONE (OUTPATIENT)
Dept: FAMILY MEDICINE CLINIC | Age: 35
End: 2020-09-02

## 2020-09-02 NOTE — TELEPHONE ENCOUNTER
----- Message from Efrain Bush sent at 9/2/2020 12:32 PM EDT -----  Regarding: JOSHUA [place] / Telephone  Contact: [phone]  Caller's first and last name: self  Reason for call: Pt would like call back to review her 8/18/20 lab work results  Callback required yes/no and why: Yes  Best contact number(s): [phone]  Details to clarify the request: n/a

## 2020-09-02 NOTE — TELEPHONE ENCOUNTER
.Verified patient with two type of identifiers. Informed pt of lab results and/or prescription(s). Pt verbalized understanding.

## 2020-09-17 DIAGNOSIS — E66.01 SEVERE OBESITY (HCC): ICD-10-CM

## 2020-09-17 RX ORDER — PHENTERMINE HYDROCHLORIDE 37.5 MG/1
TABLET ORAL
Qty: 30 TAB | Refills: 2 | Status: SHIPPED | OUTPATIENT
Start: 2020-09-17 | End: 2022-02-16 | Stop reason: SDUPTHER

## 2020-10-05 ENCOUNTER — HOSPITAL ENCOUNTER (EMERGENCY)
Age: 35
Discharge: HOME | End: 2020-10-05
Attending: EMERGENCY MEDICINE
Payer: MEDICAID

## 2020-10-05 VITALS
OXYGEN SATURATION: 100 % | TEMPERATURE: 97.8 F | HEART RATE: 70 BPM | DIASTOLIC BLOOD PRESSURE: 85 MMHG | SYSTOLIC BLOOD PRESSURE: 148 MMHG | RESPIRATION RATE: 18 BRPM

## 2020-10-05 DIAGNOSIS — J34.89 LESION OF NOSE: ICD-10-CM

## 2020-10-05 DIAGNOSIS — J02.9 PHARYNGITIS, UNSPECIFIED ETIOLOGY: Primary | ICD-10-CM

## 2020-10-05 PROCEDURE — 99282 EMERGENCY DEPT VISIT SF MDM: CPT

## 2020-10-05 PROCEDURE — 87070 CULTURE OTHR SPECIMN AEROBIC: CPT

## 2020-10-05 PROCEDURE — 87147 CULTURE TYPE IMMUNOLOGIC: CPT

## 2020-10-05 RX ORDER — PREDNISONE 20 MG/1
40 TABLET ORAL DAILY
Qty: 10 TAB | Refills: 0 | Status: SHIPPED | OUTPATIENT
Start: 2020-10-05 | End: 2020-10-10

## 2020-10-05 RX ORDER — CLINDAMYCIN HYDROCHLORIDE 300 MG/1
300 CAPSULE ORAL 4 TIMES DAILY
Qty: 28 CAP | Refills: 0 | Status: SHIPPED | OUTPATIENT
Start: 2020-10-05 | End: 2020-10-12

## 2020-10-05 NOTE — ED PROVIDER NOTES
HPI patient is a 57-year-old female with past medical history significant for bipolar depression, endometriosis, iron deficiency anemia, obesity, sickle cell trait, vertigo and obesity who presents to the ED reporting severe sore throat and a sore pimple in the right nares for over 1 week. Denies fever, cold symptoms, headache, neck pain, visual changes, focal weakness or rash. Denies any difficulty breathing, difficulty swallowing, SOB or chest pain. Denies any nausea, vomiting or diarrhea. Pt. Reports taking multi-symptom over the counter cold remedies  without relief. Past Medical History:   Diagnosis Date    Bipolar disorder ([facility] [address] 11/4/2013    Depression 3/29/2012    Endometriosis 11/20/2012    Iron deficiency anemia 9/27/2019    Obesity 2/23/2015    Severe obesity ([facility] 9/27/2019    Sickle cell trait ([facility]     UTI (lower urinary tract infection) 7/30/2012    Vertigo        Past Surgical History:   Procedure Laterality Date    HX BREAST REDUCTION  2009    In 2010 patient had to have scar tissue removed.  HX CHOLECYSTECTOMY  1998    HX DILATION AND CURETTAGE      btl    HX GYN      Tubal ligation, D & C         Family History:   Problem Relation Age of Onset    Breast Cancer Cousin     No Known Problems Mother     No Known Problems Father     No Known Problems Sister     No Known Problems Brother        Social History     Socioeconomic History    Marital status: SINGLE     Spouse name: Not on file    Number of children: Not on file    Years of education: Not on file    Highest education level: Not on file   Occupational History    Not on file   Social Needs    Financial resource strain: Not on file    Food insecurity     Worry: Not on file     Inability: Not on file    Transportation needs     Medical: Not on file     Non-medical: Not on file   Tobacco Use    Smoking status: Never Smoker    Smokeless tobacco: Never Used   Substance and Sexual Activity    Alcohol use:  Yes Comment: social/rare    Drug use: No    Sexual activity: Yes     Partners: Male     Birth control/protection: None, Surgical   Lifestyle    Physical activity     Days per week: Not on file     Minutes per session: Not on file    Stress: Not on file   Relationships    Social connections     Talks on phone: Not on file     Gets together: Not on file     Attends Rastafari service: Not on file     Active member of club or organization: Not on file     Attends meetings of clubs or organizations: Not on file     Relationship status: Not on file    Intimate partner violence     Fear of current or ex partner: Not on file     Emotionally abused: Not on file     Physically abused: Not on file     Forced sexual activity: Not on file   Other Topics Concern    Not on file   Social History Narrative    3 children, 17y, 14y, 17y. Not currently working. ALLERGIES: Patient has no known allergies. Review of Systems   Constitutional: Negative for activity change, appetite change, fever and unexpected weight change. HENT: Positive for sore throat. Negative for congestion, rhinorrhea and trouble swallowing. Eyes: Negative for visual disturbance. Respiratory: Negative for cough and shortness of breath. Cardiovascular: Negative for chest pain, palpitations and leg swelling. Gastrointestinal: Negative for abdominal pain, diarrhea, nausea and vomiting. Genitourinary: Negative for dysuria. Musculoskeletal: Negative for arthralgias and gait problem. Skin: Negative for rash. Neurological: Negative for dizziness, light-headedness and headaches. All other systems reviewed and are negative. Vitals:    10/05/20 1245   BP: (!) 148/85   Pulse: 70   Resp: 18   Temp: 97.8 °F (36.6 °C)   SpO2: 100%            Physical Exam  Vitals signs and nursing note reviewed. Constitutional:       General: She is not in acute distress. Appearance: She is well-developed. She is obese.  She is not ill-appearing, toxic-appearing or diaphoretic. Comments: Female; non smoker   HENT:      Head: Normocephalic. Right Ear: Tympanic membrane normal.      Left Ear: Tympanic membrane normal.      Nose: No rhinorrhea. Comments: Tender red papule noted in the right nares     Mouth/Throat:      Mouth: Mucous membranes are moist.      Pharynx: Posterior oropharyngeal erythema present. No pharyngeal swelling. Tonsils: No tonsillar exudate. Neck:      Musculoskeletal: Normal range of motion and neck supple. Cardiovascular:      Rate and Rhythm: Normal rate and regular rhythm. Pulmonary:      Effort: Pulmonary effort is normal.      Breath sounds: Normal breath sounds. Abdominal:      General: Bowel sounds are normal. There is no distension. Tenderness: There is no abdominal tenderness. There is no guarding. Lymphadenopathy:      Cervical: No cervical adenopathy. Skin:     General: Skin is warm and dry. Findings: No rash. Neurological:      Mental Status: She is alert and oriented to person, place, and time. MDM       Procedures      Throat culture pending. Recommend topical Bactroban to lesion in the right nares. Plan to treat with clindamycin x1 week, Bactroban and short course of prednisone. Encourage close follow-up with ENT and/or PCP for further evaluation and treatment. 2:11 PM  Patient's results and plan of care have been reviewed with her. Patient has  verbally conveyed her understanding and agreement of her signs, symptoms, diagnosis, treatment and prognosis and additionally agrees to follow up as recommended or return to the Emergency Room should her condition change prior to follow-up. Discharge instructions have also been provided to the patient with some educational information regarding her diagnosis as well a list of reasons why she would want to return to the ER prior to her follow-up appointment should her  condition change. Haleigh Claros NP

## 2020-10-05 NOTE — ED TRIAGE NOTES
Pt c/o sore throat and \"tonsil stones/rocks\"  Pt denies fever. States problem has been ongoing but she has been unable to get an appt with an ENT.

## 2020-10-07 LAB
BACTERIA SPEC CULT: ABNORMAL
BACTERIA SPEC CULT: ABNORMAL
SERVICE CMNT-IMP: ABNORMAL

## 2020-11-18 ENCOUNTER — OFFICE VISIT (OUTPATIENT)
Dept: FAMILY MEDICINE CLINIC | Age: 35
End: 2020-11-18
Payer: MEDICAID

## 2020-11-18 VITALS
BODY MASS INDEX: 39.53 KG/M2 | HEIGHT: 66 IN | DIASTOLIC BLOOD PRESSURE: 85 MMHG | HEART RATE: 66 BPM | RESPIRATION RATE: 12 BRPM | OXYGEN SATURATION: 99 % | WEIGHT: 246 LBS | SYSTOLIC BLOOD PRESSURE: 140 MMHG | TEMPERATURE: 97.5 F

## 2020-11-18 DIAGNOSIS — J35.8 TONSIL STONE: ICD-10-CM

## 2020-11-18 DIAGNOSIS — R60.9 EDEMA, UNSPECIFIED TYPE: ICD-10-CM

## 2020-11-18 DIAGNOSIS — E66.01 SEVERE OBESITY (HCC): Primary | ICD-10-CM

## 2020-11-18 PROCEDURE — 99213 OFFICE O/P EST LOW 20 MIN: CPT | Performed by: NURSE PRACTITIONER

## 2020-11-18 RX ORDER — BUMETANIDE 1 MG/1
1 TABLET ORAL
Qty: 30 TAB | Refills: 1 | Status: SHIPPED | OUTPATIENT
Start: 2020-11-18 | End: 2021-03-15

## 2020-11-18 RX ORDER — GLUCOSAMINE SULFATE 1500 MG
POWDER IN PACKET (EA) ORAL DAILY
COMMUNITY
End: 2021-08-16

## 2020-11-18 NOTE — PROGRESS NOTES
HISTORY OF PRESENT ILLNESS  Laura Desai is a 38 y.o. female. HPI  Patient comes in today for weight management  Pt was taking phentermine and she had lost weight and stopped to make sure she wasn't dependent on the phentermine. Pt also states she is on her menstrual cycle today and gains 7 pounds every time she is on menses. She states her weight got down to 230s while on medication. States it worked well for appetite, she would not usually eat much when taking. She does not want to lose too much weight. She likes being a \"heavier\" person. She plans on working on abdominal exercises as she is more interested in losing stomach. She will be more active soon, she is working at [employer]. May take Bumex maybe couple times weekly. Does not take every day. Works well to control swelling. No Known Allergies    Past Medical History:   Diagnosis Date    Bipolar disorder ([facility] [address] 11/4/2013    Depression 3/29/2012    Endometriosis 11/20/2012    Iron deficiency anemia 9/27/2019    Obesity 2/23/2015    Severe obesity ([facility] [address] 9/27/2019    Sickle cell trait ([facility] [address] [facility]     UTI (lower urinary tract infection) 7/30/2012    Vertigo        Past Surgical History:   Procedure Laterality Date    HX BREAST REDUCTION  2009    In 2010 patient had to have scar tissue removed.     HX CHOLECYSTECTOMY  1998    HX DILATION AND CURETTAGE      btl    HX GYN      Tubal ligation, D & C       Social History     Socioeconomic History    Marital status: SINGLE     Spouse name: Not on file    Number of children: Not on file    Years of education: Not on file    Highest education level: Not on file   Occupational History    Not on file   Social Needs    Financial resource strain: Not on file    Food insecurity     Worry: Not on file     Inability: Not on file    Transportation needs     Medical: Not on file     Non-medical: Not on file   Tobacco Use    Smoking status: Never Smoker    Smokeless tobacco: Never Used   Substance and Sexual Activity    Alcohol use: Yes     Comment: social/rare    Drug use: No    Sexual activity: Yes     Partners: Male     Birth control/protection: None, Surgical   Lifestyle    Physical activity     Days per week: Not on file     Minutes per session: Not on file    Stress: Not on file   Relationships    Social connections     Talks on phone: Not on file     Gets together: Not on file     Attends Restorationism service: Not on file     Active member of club or organization: Not on file     Attends meetings of clubs or organizations: Not on file     Relationship status: Not on file    Intimate partner violence     Fear of current or ex partner: Not on file     Emotionally abused: Not on file     Physically abused: Not on file     Forced sexual activity: Not on file   Other Topics Concern    Not on file   Social History Narrative    3 children, 17y, 14y, 17y. Not currently working. Family History   Problem Relation Age of Onset    Breast Cancer Cousin     No Known Problems Mother     No Known Problems Father     No Known Problems Sister     No Known Problems Brother        Current Outpatient Medications   Medication Sig    cholecalciferol (Vitamin D3) 25 mcg (1,000 unit) cap Take  by mouth daily.  mupirocin calcium (Bactroban NasaL) 2 % nasal ointment by Both Nostrils route two (2) times a day.  bumetanide (BUMEX) 1 mg tablet Take 1 Tab by mouth daily as needed (swelling).  multivitamin (ONE A DAY) tablet Take 1 Tab by mouth daily.  ascorbic acid, vitamin C, (Vitamin C) 250 mg tablet Take  by mouth.  vitamin E, dl,tocopheryl acet, (VITAMIN E ACETATE) 400 unit cap capsule Take 1 Cap by mouth daily.  phentermine (ADIPEX-P) 37.5 mg tablet TAKE 1 TABLET BY MOUTH EVERY DAY     No current facility-administered medications for this visit. Review of Systems   Constitutional: Negative for chills, fever and weight loss.    HENT:        Tonsillar stones - improved   Respiratory: Negative. Cardiovascular: Negative. Gastrointestinal: Negative. Genitourinary: Negative. Neurological: Negative. Psychiatric/Behavioral: Negative. Vitals:    11/18/20 1150   BP: (!) 140/85   Pulse: 66   Resp: 12   Temp: 97.5 °F (36.4 °C)   TempSrc: Skin   SpO2: 99%   Weight: 246 lb (111.6 kg)   Height: 5' 6\" (1.676 m)       Physical Exam  Vitals signs reviewed. Constitutional:       Appearance: Normal appearance. She is well-developed and well-groomed. She is obese. Cardiovascular:      Rate and Rhythm: Normal rate. Pulmonary:      Effort: Pulmonary effort is normal.   Musculoskeletal:      Right lower leg: No edema. Left lower leg: No edema. Skin:     General: Skin is warm and dry. Neurological:      Mental Status: She is alert and oriented to person, place, and time. Psychiatric:         Behavior: Behavior is cooperative. ASSESSMENT and PLAN    ICD-10-CM ICD-9-CM    1. Severe obesity ([facility] [address]  E66.01 278.01    2. Edema, unspecified type  R60.9 782.3 bumetanide (BUMEX) 1 mg tablet   3. Tonsil stone  J35.8 474.8      Encounter Diagnoses   Name Primary?  Severe obesity (HCC) Yes    Edema, unspecified type     Tonsil stone      Orders Placed This Encounter    cholecalciferol (Vitamin D3) 25 mcg (1,000 unit) cap    bumetanide (BUMEX) 1 mg tablet     Diagnoses and all orders for this visit:    1. Severe obesity ([facility] [address] - patient does not need refill on phentermine. Can take 1/2 tablet daily. Discussed healthy eating, discussed not skipping meals, but eating balanced meals. Making behavior changes that will maintain weight loss after medication is stopped. 2. Edema, unspecified type  -     bumetanide (BUMEX) 1 mg tablet; Take 1 Tab by mouth daily as needed (swelling). 3. Tonsil stone =- improved with better oral hygiene      Follow-up and Dispositions    · Return in about 8 weeks (around 1/13/2021), or if symptoms worsen or fail to improve.          I have reviewed the patient's allergies and made any necessary changes. Medical, procedural, social and family histories have been reviewed and updated as medically indicated. I have reconciled and/or revised patient medications in the EMR. I have discussed each diagnosis listed in this note with Savana Gibson and/or their family. I have discussed treatment options and the risk/benefit analysis of those options, including safe use of medications and possible medication side effects. Through the use of shared decision making we have agreed to the above plan. The patient has received an after-visit summary and questions were answered concerning future plans. Priyanka Weems, ABIOLA-C

## 2020-11-18 NOTE — PROGRESS NOTES
Chief Complaint   Patient presents with    Weight Management     Pt was taking phentermine and she had lost weight and stopped to make sure she wasn't dependent on the phentermine. Pt also states she is on her menstrual cycle today and gains 7 pounds every time. 1. Have you been to the ER, urgent care clinic since your last visit? Hospitalized since your last visit? No    2. Have you seen or consulted any other health care providers outside of the [address] [place] [address] since your last visit? Include any pap smears or colon screening.  No     Flu shot - Pt declined    Health Maintenance Due   Topic Date Due    DTaP/Tdap/Td series (1 - Tdap) 01/06/2004    Flu Vaccine (1) 09/01/2020

## 2021-01-15 ENCOUNTER — OFFICE VISIT (OUTPATIENT)
Dept: FAMILY MEDICINE CLINIC | Age: 36
End: 2021-01-15
Payer: MEDICAID

## 2021-01-15 VITALS
SYSTOLIC BLOOD PRESSURE: 124 MMHG | HEIGHT: 66 IN | DIASTOLIC BLOOD PRESSURE: 75 MMHG | RESPIRATION RATE: 16 BRPM | BODY MASS INDEX: 38.57 KG/M2 | WEIGHT: 240 LBS | HEART RATE: 67 BPM | TEMPERATURE: 97.1 F

## 2021-01-15 DIAGNOSIS — E66.01 SEVERE OBESITY (HCC): Primary | ICD-10-CM

## 2021-01-15 DIAGNOSIS — E78.5 HYPERLIPIDEMIA, UNSPECIFIED HYPERLIPIDEMIA TYPE: ICD-10-CM

## 2021-01-15 PROCEDURE — 99213 OFFICE O/P EST LOW 20 MIN: CPT | Performed by: NURSE PRACTITIONER

## 2021-01-15 NOTE — LETTER
NOTIFICATION RETURN TO WORK 
 
1/15/2021 12:00 PM 
 
Ms. Yolette Hernandez 
[address] [place] [id number] To Whom It May Concern: 
 
Juan Carlos Malik is currently under the care of [facility]. She will be out of work from 1/14/2021 - 1/31/2021 and will return to work on: 2/1/2021 If there are questions or concerns please have the patient contact our office. Sincerely, Michelle Ramires NP
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
## 2021-01-15 NOTE — PROGRESS NOTES
Chief Complaint   Patient presents with    Weight Management     1. Have you been to the ER, urgent care clinic since your last visit? Hospitalized since your last visit? No    2. Have you seen or consulted any other health care providers outside of the [address] [place] [address] since your last visit? Include any pap smears or colon screening.  No

## 2021-03-15 DIAGNOSIS — R60.9 EDEMA, UNSPECIFIED TYPE: ICD-10-CM

## 2021-03-15 RX ORDER — BUMETANIDE 1 MG/1
TABLET ORAL
Qty: 90 TAB | Refills: 1 | Status: SHIPPED | OUTPATIENT
Start: 2021-03-15 | End: 2021-11-02 | Stop reason: SDUPTHER

## 2021-08-16 ENCOUNTER — VIRTUAL VISIT (OUTPATIENT)
Dept: FAMILY MEDICINE CLINIC | Age: 36
End: 2021-08-16
Payer: MEDICAID

## 2021-08-16 DIAGNOSIS — Z71.85 VACCINE COUNSELING: Primary | ICD-10-CM

## 2021-08-16 DIAGNOSIS — F41.9 ANXIETY: ICD-10-CM

## 2021-08-16 PROCEDURE — 99214 OFFICE O/P EST MOD 30 MIN: CPT | Performed by: NURSE PRACTITIONER

## 2021-08-16 NOTE — PROGRESS NOTES
Yolette Hernandez (: 1985) is a 43 y.o. female, established patient, here for evaluation of the following chief complaint(s): Anxiety       ASSESSMENT/PLAN:  Below is the assessment and plan developed based on review of pertinent labs, studies, and medications. 1. Vaccine counseling - spent 30+ minutes discussing COVID infection, COVID vaccine. Encouraged patient to obtain vaccine. If she choses not to, she should continue to use mitigation measures, such as masking, hand washing, social distancing, avoid crowds, sick persons. 2. Anxiety      SUBJECTIVE/OBJECTIVE:  HPI  Patient is seen virtually for anxiety regarding COVID vaccine    Pt would like to discuss not getting COVID vaccine that her employer is now requiring. Pt has very bad anxiety in regards to this. she understands rise in COVID cases, understands Delta variant, states she wears masks, washes hands, avoids crowds. No Known Allergies    Past Medical History:   Diagnosis Date    Bipolar disorder ([facility] 2013    Depression 3/29/2012    Endometriosis 2012    Iron deficiency anemia 2019    Obesity 2015    Severe obesity ([address] 2019    Sickle cell trait ([facility] [address] [facility]     UTI (lower urinary tract infection) 2012    Vertigo        Past Surgical History:   Procedure Laterality Date    HX BREAST REDUCTION      In  patient had to have scar tissue removed.     HX CHOLECYSTECTOMY      HX DILATION AND CURETTAGE      btl    HX GYN      Tubal ligation, D & C       Social History     Socioeconomic History    Marital status: SINGLE     Spouse name: Not on file    Number of children: Not on file    Years of education: Not on file    Highest education level: Not on file   Occupational History    Not on file   Tobacco Use    Smoking status: Never Smoker    Smokeless tobacco: Never Used   Vaping Use    Vaping Use: Never used   Substance and Sexual Activity    Alcohol use: Yes     Comment: social/rare    Drug use: No    Sexual activity: Yes     Partners: Male     Birth control/protection: None, Surgical   Other Topics Concern    Not on file   Social History Narrative    3 children, 17y, 15y, 13y. Working for [employer] and part time in evening at [employer] Social Determinants of Health     Financial Resource Strain:     Difficulty of Paying Living Expenses:    Food Insecurity:     Worried About Running Out of Food in the Last Year:     [address] in the Last Year:    Transportation Needs:     Lack of Transportation (Medical):  Lack of Transportation (Non-Medical):    Physical Activity:     Days of Exercise per Week:     Minutes of Exercise per Session:    Stress:     Feeling of Stress :    Social Connections:     Frequency of Communication with Friends and Family:     Frequency of Social Gatherings with Friends and Family:     Attends [employer] Services:     Active Member of Clubs or Organizations:     Attends Club or Organization Meetings:     Marital Status:    Intimate Partner Violence:     Fear of Current or Ex-Partner:     Emotionally Abused:     Physically Abused:     Sexually Abused:        Family History   Problem Relation Age of Onset    Breast Cancer Cousin     No Known Problems Mother     No Known Problems Father     No Known Problems Sister     No Known Problems Brother        Current Outpatient Medications   Medication Sig    bumetanide (BUMEX) 1 mg tablet TAKE 1 TABLET BY MOUTH EVERY DAY AS NEEDED FOR SWELLING    mupirocin calcium (Bactroban NasaL) 2 % nasal ointment by Both Nostrils route two (2) times a day.  multivitamin (ONE A DAY) tablet Take 1 Tab by mouth daily.  ascorbic acid, vitamin C, (Vitamin C) 250 mg tablet Take 250 mg by mouth daily.  vitamin E, dl,tocopheryl acet, (VITAMIN E ACETATE) 400 unit cap capsule Take 1 Cap by mouth daily.     phentermine (ADIPEX-P) 37.5 mg tablet TAKE 1 TABLET BY MOUTH EVERY DAY (Patient not taking: Reported on 8/16/2021)     No current facility-administered medications for this visit. Review of Systems   Constitutional: Negative. Respiratory: Negative. Cardiovascular: Negative. Gastrointestinal: Negative. Genitourinary: Negative. Psychiatric/Behavioral: The patient is nervous/anxious. No flowsheet data found.     Physical Exam    [INSTRUCTIONS:  \"[x]\" Indicates a positive item  \"[]\" Indicates a negative item  -- DELETE ALL ITEMS NOT EXAMINED]    Constitutional: [x] Appears well-developed and well-nourished [x] No apparent distress      [] Abnormal -     Mental status: [x] Alert and awake  [x] Oriented to person/place/time [x] Able to follow commands    [] Abnormal -     Eyes:   EOM    [x]  Normal    [] Abnormal -   Sclera  [x]  Normal    [] Abnormal -          Discharge [x]  None visible   [] Abnormal -     HENT: [x] Normocephalic, atraumatic  [] Abnormal -   [x] Mouth/Throat: Mucous membranes are moist    External Ears [x] Normal  [] Abnormal -    Neck: [x] No visualized mass [] Abnormal -     Pulmonary/Chest: [x] Respiratory effort normal   [x] No visualized signs of difficulty breathing or respiratory distress        [] Abnormal -      Musculoskeletal:   [x] Normal gait with no signs of ataxia         [x] Normal range of motion of neck        [] Abnormal -     Neurological:        [x] No Facial Asymmetry (Cranial nerve 7 motor function) (limited exam due to video visit)          [x] No gaze palsy        [] Abnormal -          Skin:        [x] No significant exanthematous lesions or discoloration noted on facial skin         [] Abnormal -            Psychiatric:       [x] Normal Affect [] Abnormal -        [x] No Hallucinations    On this date 08/16/2021 I have spent 35 minutes reviewing previous notes, test results and face to face (virtual) with the patient discussing the diagnosis and importance of compliance with the treatment plan as well as documenting on the day of the visit. Giana Morris, was evaluated through a synchronous (real-time) audio-video encounter. The patient (or guardian if applicable) is aware that this is a billable service. Verbal consent to proceed has been obtained within the past 12 months. The visit was conducted pursuant to the emergency declaration under the [address] [place] [address] and the [employer] Act. Patient identification was verified, and a caregiver was present when appropriate. The patient was located in a state where the provider was credentialed to provide care. An electronic signature was used to authenticate this note.   -- Edgar Negron NP

## 2021-08-16 NOTE — PROGRESS NOTES
Chief Complaint   Patient presents with    Anxiety     Pt would like to discuss not getting COVID vaccine that her employer is now requiring. Pt has very bad anxiety in regards to this. 1. Have you been to the ER, urgent care clinic since your last visit? Hospitalized since your last visit? No    2. Have you seen or consulted any other health care providers outside of the [address] [place] [address] since your last visit? Include any pap smears or colon screening.  No    Health Maintenance Due   Topic Date Due    Hepatitis C Screening  Never done    COVID-19 Vaccine (1) Never done    DTaP/Tdap/Td series (1 - Tdap) Never done

## 2021-08-18 ENCOUNTER — TELEPHONE (OUTPATIENT)
Dept: FAMILY MEDICINE CLINIC | Age: 36
End: 2021-08-18

## 2021-08-18 NOTE — TELEPHONE ENCOUNTER
See how soon she can see therapist.  Check with [facility] or [facility] counseling

## 2021-08-18 NOTE — TELEPHONE ENCOUNTER
----- Message from Carlton Gonzalez sent at 8/18/2021 11:07 AM EDT -----  Regarding: NP Faustina/Telephone      General Message/Vendor Calls    Caller's first and last name: n/a      Reason for call: Following up on yesterday's call      Callback required yes/no and why: Yes to advise      Best contact number(s): [phone]      Details to clarify the request: Pt would like to see if she can be put of of work longer than the rest of this week do to needing to schedule with a therapist. Pt is willing to see a therapist especially one within the practice but will need the additional time off from work to make this happen.        Carlton Gonzalez

## 2021-08-18 NOTE — TELEPHONE ENCOUNTER
----- Message from Michael Dong sent at 8/18/2021  2:14 PM EDT -----  Regarding: NP Faustina/Telephone  General Message/Vendor Calls    Caller's first and last name:Self      Reason for call: Pt apologizing for calling again but she is wanting to know the status of a note to keep her out of work a little longer and the status of a therapist appointment for her. Callback required yes/no and why:yes, to clarify       Best contact number(s):[phone]      Details to clarify the request:Pt is hoping for a call back today just to have someone clarify for her the status and if there is any steps she can take to help herself right now.       Michael Dong

## 2021-10-14 ENCOUNTER — TELEPHONE (OUTPATIENT)
Dept: FAMILY MEDICINE CLINIC | Age: 36
End: 2021-10-14

## 2021-10-15 NOTE — TELEPHONE ENCOUNTER
Verified patient with two type of identifiers. Pt states her stress at work is increasing again and has not yet scheduled with a therapist due to [facility] not having anything and another group she did not like the reviews. \" Pt scheduled with Dmitry Garcia 11/2/21 at 2 PM. Pt verbalized understanding.

## 2021-11-02 ENCOUNTER — VIRTUAL VISIT (OUTPATIENT)
Dept: BEHAVIORAL/MENTAL HEALTH CLINIC | Age: 36
End: 2021-11-02

## 2021-11-02 ENCOUNTER — TELEPHONE (OUTPATIENT)
Dept: FAMILY MEDICINE CLINIC | Age: 36
End: 2021-11-02

## 2021-11-02 DIAGNOSIS — R60.9 EDEMA, UNSPECIFIED TYPE: ICD-10-CM

## 2021-11-02 DIAGNOSIS — F43.23 ADJUSTMENT DISORDER WITH MIXED ANXIETY AND DEPRESSED MOOD: Primary | ICD-10-CM

## 2021-11-02 RX ORDER — BUMETANIDE 1 MG/1
TABLET ORAL
Qty: 90 TABLET | Refills: 1 | Status: SHIPPED | OUTPATIENT
Start: 2021-11-02

## 2021-11-02 NOTE — PROGRESS NOTES
Began to meet with client for scheduled virtual visit; however, when this writer shared that role was short term (6-8 session model), client states that she desired more long term counseling and did not want to do the short term. She states that her work related stressors have abated and she is feeling better in that area. Sent the client listing of local long term therapists and psychiatrists in the community as well as some anxiety management resources. Will also inform referring provider. No follow up needed.

## 2021-11-02 NOTE — TELEPHONE ENCOUNTER
----- Message from Brandon Ruiz LCSW sent at 11/2/2021  2:32 PM EDT -----  Regarding: referred pt  Met briefly with this client, When I shared with her that I only did short term (6-8 sessions), she decided that she wanted to have more long term, so I have sent her some resources locally. She shared that work stressors have abated and she is doing a little better. I also sent her some anxiety management resources, too. She also shared that she also needs a refill of her Bumex.   Just CAITLYN--Kiersten

## 2021-12-16 DIAGNOSIS — E66.01 SEVERE OBESITY (HCC): ICD-10-CM

## 2021-12-16 RX ORDER — PHENTERMINE HYDROCHLORIDE 37.5 MG/1
TABLET ORAL
Qty: 30 TABLET | OUTPATIENT
Start: 2021-12-16

## 2021-12-17 NOTE — TELEPHONE ENCOUNTER
Verified patient with two type of identifiers. Pt scheduled for 2/16/22 at 12:30 PM. Pt verbalized understanding.

## 2022-01-06 ENCOUNTER — NURSE TRIAGE (OUTPATIENT)
Dept: OTHER | Facility: CLINIC | Age: 37
End: 2022-01-06

## 2022-01-07 NOTE — TELEPHONE ENCOUNTER
Received call from [employer]  at [facility], caller not on line. Complaint: nausea when standing  - feels something at the bra line - hernia pain per pt     Market: Meghan Love Name: THE [facility]     Caller's telephone number verified as [phone]    Connected with caller via phone, please see below triage     Received call from [employer]  at [facility] with Red Flag Complaint. Subjective: Caller states \"I have been having pain between my bra strap and upper stomach \"     Current Symptoms:   +overly tired   +loss of appetite   +nauseated when standing   +RUQ and LUQ - between the two areas   -denies chest pain or pain that radiates   -\"can't put my bra on or I feel nauseated and sick with weakness\"   +pain subsides when laying down- worse with standing   -denies shortness of breath   +pelvic pain a few days ago   +felt I needed to elevate my left  leg a few days ago - \"felt like a blood clot was trying to form but that has gone away\"  +ran a low grade fever a few days ago      Onset: 2 days ago; sudden    Associated Symptoms: reduced activity, reduced appetite, reduced fluid intake, increased sleepiness    Pain Severity: \"hard to say- if there is any pressure it is up there \"; pressure ; intermittent-depends on standing       Temperature: Denies  N/A     What has been tried:   +dramaminie +Excedrin +Nyquil +ibuprophen +started taking my iron pills again     LMP: this week - just ended  Pregnant: No    Recommended disposition: Go to ED now. Care advice provided, patient verbalizes understanding; denies any other questions or concerns; instructed to call back for any new or worsening symptoms. Discussed that symptoms reported could potentially be life threatening - pt verbalized understanding     Pt will proceed to ED      Attention Provider: Thank you for allowing me to participate in the care of your patient. The patient was connected to triage in response to information provided to the [facility]. Please do not respond through this encounter as the response is not directed to a shared pool.       Reason for Disposition   [1] SEVERE pain (e.g., excruciating) AND [2] present > 1 hour    Protocols used: ABDOMINAL PAIN - UPPER-ADULT-AH

## 2022-01-14 ENCOUNTER — TELEPHONE (OUTPATIENT)
Dept: FAMILY MEDICINE CLINIC | Age: 37
End: 2022-01-14

## 2022-01-14 NOTE — TELEPHONE ENCOUNTER
----- Message from Melanie Lombardo sent at 1/11/2022 11:46 AM EST -----  Subject: Message to Provider    QUESTIONS  Information for Provider? pt was seen in the ED over the weekend and   received time off from work until tomorrow. pt states she is not mentally   ready to go back to work just yet and is looking for an extension on her   DrLeandro brooksuse from work. pt states she is supposed to see someone for   counseling as well and will need the extended Dr anuj sent there as well   for her appt. pt requesting a call back regarding this, thank you!   ---------------------------------------------------------------------------  --------------  CALL BACK INFO  What is the best way for the office to contact you? Do not leave any   message, patient will call back for answer  Preferred Call Back Phone Number? [phone]  ---------------------------------------------------------------------------  --------------  SCRIPT ANSWERS  Relationship to Patient?  Self

## 2022-01-14 NOTE — TELEPHONE ENCOUNTER
Verified patient with two type of identifiers. Pt states taken care of and needs nothing else at this time.

## 2022-02-16 ENCOUNTER — OFFICE VISIT (OUTPATIENT)
Dept: FAMILY MEDICINE CLINIC | Age: 37
End: 2022-02-16
Payer: MEDICAID

## 2022-02-16 VITALS
HEART RATE: 66 BPM | BODY MASS INDEX: 41.43 KG/M2 | RESPIRATION RATE: 12 BRPM | SYSTOLIC BLOOD PRESSURE: 138 MMHG | OXYGEN SATURATION: 100 % | HEIGHT: 66 IN | WEIGHT: 257.8 LBS | TEMPERATURE: 97.1 F | DIASTOLIC BLOOD PRESSURE: 90 MMHG

## 2022-02-16 DIAGNOSIS — Z00.00 ROUTINE GENERAL MEDICAL EXAMINATION AT A HEALTH CARE FACILITY: Primary | ICD-10-CM

## 2022-02-16 DIAGNOSIS — E78.5 HYPERLIPIDEMIA, UNSPECIFIED HYPERLIPIDEMIA TYPE: ICD-10-CM

## 2022-02-16 DIAGNOSIS — R60.9 EDEMA, UNSPECIFIED TYPE: ICD-10-CM

## 2022-02-16 DIAGNOSIS — F41.9 ANXIETY: ICD-10-CM

## 2022-02-16 DIAGNOSIS — E55.9 VITAMIN D DEFICIENCY: ICD-10-CM

## 2022-02-16 DIAGNOSIS — E66.01 OBESITY, MORBID (HCC): ICD-10-CM

## 2022-02-16 DIAGNOSIS — Z11.59 NEED FOR HEPATITIS C SCREENING TEST: ICD-10-CM

## 2022-02-16 PROCEDURE — 99395 PREV VISIT EST AGE 18-39: CPT | Performed by: NURSE PRACTITIONER

## 2022-02-16 RX ORDER — PHENTERMINE HYDROCHLORIDE 37.5 MG/1
37.5 TABLET ORAL DAILY
Qty: 30 TABLET | Refills: 2 | Status: SHIPPED | OUTPATIENT
Start: 2022-02-16
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
## 2022-02-16 NOTE — PROGRESS NOTES
Chief Complaint   Patient presents with    Cholesterol Problem    Obesity         1. Have you been to the ER, urgent care clinic since your last visit? Hospitalized since your last visit? No    2. Have you seen or consulted any other health care providers outside of the [address] [place] [address] since your last visit? Include any pap smears or colon screening.  No    PAP - Pt aware overdue  Flu shot - Pt declined     Health Maintenance Due   Topic Date Due    Hepatitis C Screening  Never done    COVID-19 Vaccine (1) Never done    DTaP/Tdap/Td series (1 - Tdap) Never done    Cervical cancer screen  11/20/2017    Flu Vaccine (1) Never done

## 2022-02-17 LAB
25(OH)D3 SERPL-MCNC: 14.2 NG/ML (ref 30–100)
ALBUMIN SERPL-MCNC: 4 G/DL (ref 3.5–5)
ALBUMIN/GLOB SERPL: 1.1 {RATIO} (ref 1.1–2.2)
ALP SERPL-CCNC: 74 U/L (ref 45–117)
ALT SERPL-CCNC: 23 U/L (ref 12–78)
ANION GAP SERPL CALC-SCNC: 7 MMOL/L (ref 5–15)
APPEARANCE UR: CLEAR
AST SERPL-CCNC: 14 U/L (ref 15–37)
BACTERIA URNS QL MICRO: ABNORMAL /HPF
BILIRUB SERPL-MCNC: 0.3 MG/DL (ref 0.2–1)
BILIRUB UR QL: NEGATIVE
BUN SERPL-MCNC: 14 MG/DL (ref 6–20)
BUN/CREAT SERPL: 15 (ref 12–20)
CALCIUM SERPL-MCNC: 9.5 MG/DL (ref 8.5–10.1)
CHLORIDE SERPL-SCNC: 104 MMOL/L (ref 97–108)
CHOLEST SERPL-MCNC: 258 MG/DL
CO2 SERPL-SCNC: 26 MMOL/L (ref 21–32)
COLOR UR: ABNORMAL
CREAT SERPL-MCNC: 0.94 MG/DL (ref 0.55–1.02)
EPITH CASTS URNS QL MICRO: ABNORMAL /LPF
ERYTHROCYTE [DISTWIDTH] IN BLOOD BY AUTOMATED COUNT: 18.4 % (ref 11.5–14.5)
EST. AVERAGE GLUCOSE BLD GHB EST-MCNC: 114 MG/DL
GLOBULIN SER CALC-MCNC: 3.7 G/DL (ref 2–4)
GLUCOSE SERPL-MCNC: 93 MG/DL (ref 65–100)
GLUCOSE UR STRIP.AUTO-MCNC: NEGATIVE MG/DL
HBA1C MFR BLD: 5.6 % (ref 4–5.6)
HCT VFR BLD AUTO: 34.7 % (ref 35–47)
HCV AB SERPL QL IA: NONREACTIVE
HDLC SERPL-MCNC: 61 MG/DL
HDLC SERPL: 4.2 {RATIO} (ref 0–5)
HGB BLD-MCNC: 10.6 G/DL (ref 11.5–16)
HGB UR QL STRIP: NEGATIVE
HYALINE CASTS URNS QL MICRO: ABNORMAL /LPF (ref 0–5)
KETONES UR QL STRIP.AUTO: NEGATIVE MG/DL
LDLC SERPL CALC-MCNC: 175.2 MG/DL (ref 0–100)
LEUKOCYTE ESTERASE UR QL STRIP.AUTO: NEGATIVE
MCH RBC QN AUTO: 22.9 PG (ref 26–34)
MCHC RBC AUTO-ENTMCNC: 30.5 G/DL (ref 30–36.5)
MCV RBC AUTO: 75.1 FL (ref 80–99)
NITRITE UR QL STRIP.AUTO: NEGATIVE
NRBC # BLD: 0 K/UL (ref 0–0.01)
NRBC BLD-RTO: 0 PER 100 WBC
PH UR STRIP: 6 [PH] (ref 5–8)
PLATELET # BLD AUTO: 363 K/UL (ref 150–400)
PMV BLD AUTO: 11.6 FL (ref 8.9–12.9)
POTASSIUM SERPL-SCNC: 4.1 MMOL/L (ref 3.5–5.1)
PROT SERPL-MCNC: 7.7 G/DL (ref 6.4–8.2)
PROT UR STRIP-MCNC: NEGATIVE MG/DL
RBC # BLD AUTO: 4.62 M/UL (ref 3.8–5.2)
RBC #/AREA URNS HPF: ABNORMAL /HPF (ref 0–5)
SODIUM SERPL-SCNC: 137 MMOL/L (ref 136–145)
SP GR UR REFRACTOMETRY: 1.01 (ref 1–1.03)
TRIGL SERPL-MCNC: 109 MG/DL (ref ?–150)
TSH SERPL DL<=0.05 MIU/L-ACNC: 1.25 UIU/ML (ref 0.36–3.74)
UA: UC IF INDICATED,UAUC: ABNORMAL
UROBILINOGEN UR QL STRIP.AUTO: 0.2 EU/DL (ref 0.2–1)
VLDLC SERPL CALC-MCNC: 21.8 MG/DL
WBC # BLD AUTO: 4.1 K/UL (ref 3.6–11)
WBC URNS QL MICRO: ABNORMAL /HPF (ref 0–4)

## 2022-02-22 RX ORDER — ERGOCALCIFEROL 1.25 MG/1
50000 CAPSULE ORAL
Qty: 13 CAPSULE | Refills: 3 | Status: SHIPPED | OUTPATIENT
Start: 2022-02-22

## 2022-02-22 NOTE — PROGRESS NOTES
Diabetes and thyroid screen negative. Liver and kidney function normal.      Blood counts stable (anemia stable). Make sure your multivitamin has at least 18mg of iron. Hepatitis C screening negative. Vitamin D is low. Please fill prescription for Vitamin D to take once weekly    LDL, or bad cholesterol, is elevated. This is the cholesterol that forms plaque in your arteries that leads to stroke and heart attack. According to the [address] [facility] risk , your 10 year risk of having a heart or stroke is 0.8%. They do not recommend medications until that risk is greater than 7.5%. Work on diet and exercise -   Decrease intake of beef, lamb, pork (sausage, denney), foods with lard/shortening, dairy products with whole milk, saturated oils (coconut and palm oil), fried foods, desserts, sugary drinks, and packaged goods. Increase intake of fiber (oats, samy seeds, flaxseeds, barley, quinoa), fatty fish (salmon, sardines, trout, mackerel, tuna), lean meats (pork tenderloin, turkey, sirloin steak), nuts (especially almonds and walnuts), seeds, beans, vegetables and dark leafy greens (spinach/kale), skin of fruits, berries, olive oil, avocados/avocado oil, canola oil. Eat leaner meats, low-fat/fat-free dairy products, and yogurts.

## 2022-03-19 PROBLEM — D50.9 IRON DEFICIENCY ANEMIA: Status: ACTIVE | Noted: 2019-09-27

## 2022-03-19 PROBLEM — E66.01 OBESITY, MORBID (HCC): Status: ACTIVE | Noted: 2019-09-27

## 2022-03-20 PROBLEM — D57.3 SICKLE CELL TRAIT (HCC): Status: ACTIVE | Noted: 2019-09-27

## 2022-06-16 ENCOUNTER — TELEPHONE (OUTPATIENT)
Dept: FAMILY MEDICINE CLINIC | Age: 37
End: 2022-06-16

## 2022-06-16 NOTE — TELEPHONE ENCOUNTER
Patient was bitten by something, and it is swollen, warm to the touch, and painful.  (her inner arm)  Please call [phone].

## 2022-08-01 ENCOUNTER — TELEPHONE (OUTPATIENT)
Dept: FAMILY MEDICINE CLINIC | Age: 37
End: 2022-08-01

## 2022-08-01 ENCOUNTER — NURSE TRIAGE (OUTPATIENT)
Dept: OTHER | Facility: CLINIC | Age: 37
End: 2022-08-01

## 2022-08-01 NOTE — TELEPHONE ENCOUNTER
Received call from [address] at [facility] with Red Flag Complaint. Subjective: Caller states \"Two weeks ago I poked myself with metal tweezers in my left pointer finger. Now it's infected\"     Current Symptoms: left pointer finger pain, tender to touch, green discharge, swelling, hot to touch, fatigue, tip of the finger looks red    Onset: 2 weeks ago; gradual    Associated Symptoms: reduced activity    Pain Severity: 5/10; aching; constant    Temperature: Denies     What has been tried: soaked in peroxide, drained    LMP: NA Pregnant: NA    Recommended disposition: See in Office Today    Care advice provided, patient verbalizes understanding; denies any other questions or concerns; instructed to call back for any new or worsening symptoms. Patient/Caller agrees with recommended disposition; writer provided warm transfer to Barbara Quevedo  at [facility] for appointment scheduling    Attention Provider: Thank you for allowing me to participate in the care of your patient. The patient was connected to triage in response to information provided to the E[facility]. Please do not respond through this encounter as the response is not directed to a shared pool.   Reason for Disposition   Looks infected (e.g., spreading redness, red streak, pus)    Protocols used: Finger Pain-ADULT-[place]

## 2022-08-01 NOTE — TELEPHONE ENCOUNTER
Verified patient with two type of identifiers. Pt called in - she is having redness, swelling and pain after accidentally injuring left pointer finger. Requesting abx sent to pharmacy.  See Nurse Triage note

## 2022-08-01 NOTE — TELEPHONE ENCOUNTER
She needs an office visit. She will need tetanus booster, and I will have to send for xray of finger to make sure she doesn't have infection to the bone. If it goes to bone, she could possibly lose part of finger. Schedule for 1230 tomorrow if spot still available.

## 2022-08-02 ENCOUNTER — OFFICE VISIT (OUTPATIENT)
Dept: FAMILY MEDICINE CLINIC | Age: 37
End: 2022-08-02
Payer: MEDICAID

## 2022-08-02 ENCOUNTER — HOSPITAL ENCOUNTER (OUTPATIENT)
Dept: GENERAL RADIOLOGY | Age: 37
Discharge: HOME | End: 2022-08-02
Payer: MEDICAID

## 2022-08-02 VITALS
HEART RATE: 66 BPM | TEMPERATURE: 98.4 F | DIASTOLIC BLOOD PRESSURE: 67 MMHG | BODY MASS INDEX: 38.6 KG/M2 | HEIGHT: 66 IN | OXYGEN SATURATION: 100 % | RESPIRATION RATE: 16 BRPM | SYSTOLIC BLOOD PRESSURE: 133 MMHG | WEIGHT: 240.2 LBS

## 2022-08-02 DIAGNOSIS — Z23 ENCOUNTER FOR IMMUNIZATION: ICD-10-CM

## 2022-08-02 DIAGNOSIS — E66.01 OBESITY, MORBID (HCC): ICD-10-CM

## 2022-08-02 DIAGNOSIS — S61.231A INFECTED PUNCTURE WOUND OF LEFT INDEX FINGER, INITIAL ENCOUNTER: ICD-10-CM

## 2022-08-02 DIAGNOSIS — L08.9 INFECTED PUNCTURE WOUND OF LEFT INDEX FINGER, INITIAL ENCOUNTER: Primary | ICD-10-CM

## 2022-08-02 DIAGNOSIS — L08.9 INFECTED PUNCTURE WOUND OF LEFT INDEX FINGER, INITIAL ENCOUNTER: ICD-10-CM

## 2022-08-02 DIAGNOSIS — S61.231A INFECTED PUNCTURE WOUND OF LEFT INDEX FINGER, INITIAL ENCOUNTER: Primary | ICD-10-CM

## 2022-08-02 PROCEDURE — 90715 TDAP VACCINE 7 YRS/> IM: CPT | Performed by: NURSE PRACTITIONER

## 2022-08-02 PROCEDURE — 99213 OFFICE O/P EST LOW 20 MIN: CPT | Performed by: NURSE PRACTITIONER

## 2022-08-02 PROCEDURE — 73140 X-RAY EXAM OF FINGER(S): CPT

## 2022-08-02 RX ORDER — CEPHALEXIN 500 MG/1
500 CAPSULE ORAL 3 TIMES DAILY
Qty: 30 CAPSULE | Refills: 0 | Status: SHIPPED | OUTPATIENT
Start: 2022-08-02 | End: 2022-08-12

## 2022-08-02 NOTE — PATIENT INSTRUCTIONS
Vaccine Information Statement    Tdap (Tetanus, Diphtheria, Pertussis) Vaccine: What You Need to Know     Many vaccine information statements are available in [place] and other languages. See www.immunize.org/vis. Hojas de información sobre vacunas están disponibles en español y en muchos otros idiomas. Visite www.immunize.org/vis. 1. Why get vaccinated? Tdap vaccine can prevent tetanus, diphtheria, and pertussis. Diphtheria and pertussis spread from person to person. Tetanus enters the body through cuts or wounds. TETANUS (T) causes painful stiffening of the muscles. Tetanus can lead to serious health problems, including being unable to open the mouth, having trouble swallowing and breathing, or death. DIPHTHERIA (D) can lead to difficulty breathing, heart failure, paralysis, or death. PERTUSSIS (aP), also known as whooping cough, can cause uncontrollable, violent coughing that makes it hard to breathe, eat, or drink. Pertussis can be extremely serious especially in babies and young children, causing pneumonia, convulsions, brain damage, or death. In teens and adults, it can cause weight loss, loss of bladder control, passing out, and rib fractures from severe coughing. 2. Tdap vaccine     Tdap is only for children 7 years and older, adolescents, and adults. Adolescents should receive a single dose of Tdap, preferably at age 6 or 15 years. Pregnant people should get a dose of Tdap during every pregnancy, preferably during the early part of the third trimester, to help protect the  from pertussis. Infants are most at risk for severe, life-threatening complications from pertussis. Adults who have never received Tdap should get a dose of Tdap.       Also, adults should receive a booster dose of either Tdap or Td (a different vaccine that protects against tetanus and diphtheria but not pertussis) every 10 years, or after 5 years in the case of a severe or dirty wound or burn.     Tdap may be given at the same time as other vaccines. 3. Talk with your health care provider    Tell your vaccination provider if the person getting the vaccine:  Has had an allergic reaction after a previous dose of any vaccine that protects against tetanus, diphtheria, or pertussis, or has any severe, life-threatening allergies   Has had a coma, decreased level of consciousness, or prolonged seizures within 7 days after a previous dose of any pertussis vaccine (DTP, DTaP, or Tdap)  Has seizures or another nervous system problem  Has ever had Guillain-Barré Syndrome (also called GBS)  Has had severe pain or swelling after a previous dose of any vaccine that protects against tetanus or diphtheria    In some cases, your health care provider may decide to postpone Tdap vaccination until a future visit. People with minor illnesses, such as a cold, may be vaccinated. People who are moderately or severely ill should usually wait until they recover before getting Tdap vaccine. Your health care provider can give you more information. 4. Risks of a vaccine reaction    Pain, redness, or swelling where the shot was given, mild fever, headache, feeling tired, and nausea, vomiting, diarrhea, or stomachache sometimes happen after Tdap vaccination. People sometimes faint after medical procedures, including vaccination. Tell your provider if you feel dizzy or have vision changes or ringing in the ears. As with any medicine, there is a very remote chance of a vaccine causing a severe allergic reaction, other serious injury, or death. 5. What if there is a serious problem? An allergic reaction could occur after the vaccinated person leaves the clinic.  If you see signs of a severe allergic reaction (hives, swelling of the face and throat, difficulty breathing, a fast heartbeat, dizziness, or weakness), call 9-1-1 and get the person to the nearest hospital.    For other signs that concern you, call your health care provider. Adverse reactions should be reported to the Vaccine Adverse Event Reporting System (VAERS). Your health care provider will usually file this report, or you can do it yourself. Visit the VAERS website at www.vaers. [facility].gov or call 3-112.731.1878. VAERS is only for reporting reactions, and VAERS staff members do not give medical advice. 6. The National Vaccine Injury Compensation Program    The [facility] Vaccine Injury Compensation Program (VICP) is a federal program that was created to compensate people who may have been injured by certain vaccines. Claims regarding alleged injury or death due to vaccination have a time limit for filing, which may be as short as two years. Visit the VICP website at www.[facility].gov/vaccinecompensation or call 5-839.763.3081 to learn about the program and about filing a claim. 7. How can I learn more? Ask your health care provider. Call your local or state health department. Visit the website of the Food and Drug Administration (FDA) for vaccine package inserts and additional information at www.fda.gov/vaccines-blood-biologics/vaccines. Contact the Centers for Disease Control and Prevention (CDC): Call 8-272.112.3763 (1-800-CDC-INFO) or  Visit CDCs website at www.cdc.gov/vaccines. Vaccine Information Statement   Tdap (Tetanus, Diphtheria, Pertussis) Vaccine  8/6/2021  42 DAVID Corey [id number]-[id number]   Department of Health and Human Services  Centers for Disease Control and Prevention    Office Use Only

## 2022-08-02 NOTE — PROGRESS NOTES
Yolette Hernandez (: 1985) is a 42 y.o. female, established patient, here for evaluation of the following chief complaint(s):  Finger Swelling       ASSESSMENT/PLAN:  Below is the assessment and plan developed based on review of pertinent history, physical exam, labs, studies, and medications. 1. Infected puncture wound of left index finger, initial encounter - will check xray, sent abx,. Patient to wash with soap and water. Keep clean and dry. -     cephALEXin (KEFLEX) 500 mg capsule; Take 1 Capsule by mouth three (3) times daily for 10 days. , Normal, Disp-30 Capsule, R-0  -     XR 2ND FINGER LT MIN 2 V; Future  2. Encounter for immunization  -     TDAP, BOOSTRIX, (AGE 10 YRS+), IM  -     [place] IMMUNIZ ADMIN,1 SINGLE/COMB VAC/TOXOID  3. Obesity, morbid ([address] - has lost 17lbs. No phentermine. Continue with diet and exercise. Return if symptoms worsen or fail to improve. SUBJECTIVE/OBJECTIVE:  HPI  patient comes in today for left index finger infection  Pt is here due to injury and swelling of left index finger. \"Two weeks ago I poked myself with metal tweezers in my left pointer finger. Now it's infected\"  states she opened area and had green pus draining from wound. Area has been swollen, red, painful. Denies fever, chills. Has lost 17 pounds since last visit!! Congratulated patient on success. Walking 4-6 miles at least [id number] steps daily. Daughter turned 23yo in may. States she will be going to [employer] soon and her son will be moving out, this will release a lot of her stress.   No Known Allergies    Past Medical History:   Diagnosis Date    Bipolar disorder ([address] 2013    Depression 3/29/2012    Endometriosis 2012    Iron deficiency anemia 2019    Obesity 2015    Severe obesity ([address] 2019    Sickle cell trait ([address]     UTI (lower urinary tract infection) 2012    Vertigo        Past Surgical History:   Procedure Laterality Date    HX BREAST REDUCTION      In 2010 patient had to have scar tissue removed. HX CHOLECYSTECTOMY  1998    HX DILATION AND CURETTAGE      btl    HX GYN      Tubal ligation, D & C       Social History     Socioeconomic History    Marital status: SINGLE     Spouse name: Not on file    Number of children: Not on file    Years of education: Not on file    Highest education level: Not on file   Occupational History    Not on file   Tobacco Use    Smoking status: Never    Smokeless tobacco: Never   Vaping Use    Vaping Use: Never used   Substance and Sexual Activity    Alcohol use: Yes     Comment: social/rare    Drug use: No    Sexual activity: Yes     Partners: Male     Birth control/protection: None, Surgical   Other Topics Concern    Not on file   Social History Narrative    3 children, 17y, 14y, 13y. 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 Social Determinants of Health     Financial Resource Strain: Not on file   Food Insecurity: Not on file   Transportation Needs: Not on file   Physical Activity: Not on file   Stress: Not on file   Social Connections: Not on file   Intimate Partner Violence: Not on file   Housing Stability: Not on file       Family History   Problem Relation Age of Onset    Breast Cancer Cousin     No Known Problems Mother     No Known Problems Father     No Known Problems Sister     No Known Problems Brother        Current Outpatient Medications   Medication Sig    cephALEXin (KEFLEX) 500 mg capsule Take 1 Capsule by mouth three (3) times daily for 10 days. ergocalciferol (ERGOCALCIFEROL) 1,250 mcg (50,000 unit) capsule Take 1 Capsule by mouth every seven (7) days. phentermine (ADIPEX-P) 37.5 mg tablet Take 1 Tablet by mouth daily. bumetanide (BUMEX) 1 mg tablet TAKE 1 TABLET BY MOUTH EVERY DAY AS NEEDED FOR SWELLING    multivitamin (ONE A DAY) tablet Take 1 Tab by mouth daily. ascorbic acid, vitamin C, (VITAMIN C) 250 mg tablet Take 250 mg by mouth daily.     vitamin E, dl,tocopheryl acet, (VITAMIN E ACETATE) 400 unit cap capsule Take 1 Cap by mouth daily. No current facility-administered medications for this visit. Review of Systems   Constitutional:  Negative for chills and fever. Respiratory: Negative. Cardiovascular: Negative. Skin:  Positive for wound (puncture wound left index finger, tender, mild swelling). Vitals:    08/02/22 1253   BP: 133/67   Pulse: 66   Resp: 16   Temp: 98.4 °F (36.9 °C)   TempSrc: Oral   SpO2: 100%   Weight: 240 lb 3.2 oz (109 kg)   Height: 5' 6\" (1.676 m)     Physical Exam  Constitutional:       Appearance: Normal appearance. She is obese. Cardiovascular:      Rate and Rhythm: Normal rate. Pulmonary:      Effort: Pulmonary effort is normal.   Skin:     General: Skin is warm and dry. Findings: Wound (puncture wound on pad of left index finger, TTP, mild swelling, no current drainage) present. Neurological:      Mental Status: She is alert and oriented to person, place, and time. Psychiatric:         Mood and Affect: Mood normal.         Behavior: Behavior normal.         Thought Content: Thought content normal.         Judgment: Judgment normal.         On this date 08/02/2022 I have spent 20 minutes reviewing previous notes, test results and face to face with the patient discussing the diagnosis and importance of compliance with the treatment plan as well as documenting on the day of the visit. An electronic signature was used to authenticate this note.   -- Anisa Dover NP

## 2022-08-02 NOTE — PROGRESS NOTES
Chief Complaint   Patient presents with    Finger Swelling       1. \"Have you been to the ER, urgent care clinic since your last visit? Hospitalized since your last visit? \" No    2. \"Have you seen or consulted any other health care providers outside of the [address] [place] [address] since your last visit? \" No     3. For patients aged 39-70: Has the patient had a colonoscopy / FIT/ Cologuard? NA - based on age      If the patient is female:    4. For patients aged 41-77: Has the patient had a mammogram within the past 2 years? NA - based on age or sex      11. For patients aged 21-65: Has the patient had a pap smear? No    Pt is here due to injury and swelling of left index finger. \"Two weeks ago I poked myself with metal tweezers in my left pointer finger.  Now it's infected\"    Health Maintenance Due   Topic Date Due    Cervical cancer screen  11/20/2017

## 2022-09-29 ENCOUNTER — NURSE TRIAGE (OUTPATIENT)
Dept: OTHER | Facility: CLINIC | Age: 37
End: 2022-09-29

## 2022-09-29 ENCOUNTER — TELEPHONE (OUTPATIENT)
Dept: FAMILY MEDICINE CLINIC | Age: 37
End: 2022-09-29

## 2022-09-29 DIAGNOSIS — K13.0 ALLERGIC CONTACT CHEILITIS: Primary | ICD-10-CM

## 2022-09-29 NOTE — TELEPHONE ENCOUNTER
Patient called stating she's experiencing lip swelling, discoloration of lips, and said they feel like sand paper. She requested a call back from the nurse and can be reached at [phone].

## 2022-09-29 NOTE — TELEPHONE ENCOUNTER
Received call from Charo at [facility] with [employer]. Subjective: Caller states \"mouth is swollen, lips feel like sandpaper, discoloration\"     Current Symptoms: mouth is swollen both inside and outside, lips feel like sandpaper, discoloration    Onset: 1 week ago; worsening    Associated Symptoms:  none    Pain Severity: \"irritating\"    Temperature: n/a    What has been tried: benadryl did not help    LMP: NA Pregnant: NA    Recommended disposition: See PCP within 3 Days for further evaluation of lip swelling. Care advice provided, patient verbalizes understanding; denies any other questions or concerns; instructed to call back for any new or worsening symptoms. Patient/Caller agrees with recommended disposition; writer provided warm transfer to Chaparrita Thomas at [facility] for appointment scheduling    Attention Provider: Thank you for allowing me to participate in the care of your patient. The patient was connected to triage in response to information provided to the ECC. Please do not respond through this encounter as the response is not directed to a shared pool.     Reason for Disposition   Lip swelling lasts > 3 days    Protocols used: Lip Swelling-ADULT-

## 2022-09-29 NOTE — TELEPHONE ENCOUNTER
Verified patient with two type of identifiers. Spoke to pt - she has been having lip swelling, discoloration and cracking x 2 weeks. She has tried benadryl and carmex without any relief. Says her lips feel like sandpaper.

## 2022-09-30 RX ORDER — TRIAMCINOLONE ACETONIDE 1 MG/G
CREAM TOPICAL 2 TIMES DAILY
Qty: 15 G | Refills: 0 | Status: SHIPPED | OUTPATIENT
Start: 2022-09-30

## 2022-09-30 RX ORDER — PREDNISONE 20 MG/1
20 TABLET ORAL DAILY
Qty: 3 TABLET | Refills: 0 | Status: SHIPPED | OUTPATIENT
Start: 2022-09-30 | End: 2022-10-03

## 2022-09-30 NOTE — TELEPHONE ENCOUNTER
Could be cause by an allergic reaction to:  toothpaste  mouthwash  toothpicks  dental floss  fragrances  certain medications, such as neomycin or bacitracin  sunscreen  certain foods, such as citrus fruits or cinnamon  rubber or latex products  Ingredients in lipsticks and lip balms    Patient to avoid lipsticks, make sure she hasn't change oral care products. drink water to stay hydrated  refrain from biting, licking, or picking at the lips  avoid items made of metal on the lips, such as lip piercings  use a humidifier  use a cool compress to relieve the itching    Will provide a prescription for topical steroid cream to use twice daily for 1 week. Do  not use more than 1 week on the face. Will send a prescription for Prednisone 20mg daily x3 days to help. Patient can use petroleum jelly daily in between uses of steroid cream    Orders Placed This Encounter    predniSONE (DELTASONE) 20 mg tablet     Sig: Take 1 Tablet by mouth daily for 3 days. Dispense:  3 Tablet     Refill:  0    triamcinolone acetonide (KENALOG) 0.1 % topical cream     Sig: Apply  to affected area two (2) times a day.  use thin layer for 1 week     Dispense:  15 g     Refill:  0

## 2022-11-06 DIAGNOSIS — R60.9 EDEMA, UNSPECIFIED TYPE: ICD-10-CM

## 2022-11-08 RX ORDER — BUMETANIDE 1 MG/1
TABLET ORAL
Qty: 30 TABLET | Refills: 5 | Status: SHIPPED | OUTPATIENT
Start: 2022-11-08

## 2022-11-25 ENCOUNTER — VIRTUAL VISIT (OUTPATIENT)
Dept: FAMILY MEDICINE CLINIC | Age: 37
End: 2022-11-25

## 2022-11-25 DIAGNOSIS — K59.09 OTHER CONSTIPATION: Primary | ICD-10-CM

## 2022-11-25 PROCEDURE — 99213 OFFICE O/P EST LOW 20 MIN: CPT | Performed by: FAMILY MEDICINE

## 2022-11-25 NOTE — PROGRESS NOTES
HISTORY OF PRESENT ILLNESS  Muna Grossman is a 42 y.o. female,   her main concerns were provided on virtual video format visit, a telemed format,   Pt Have been trying to stay safe  , present with the c/o Constipation with Hard stool, every 14 days  and has not noticed any rectal bleeding , no tarry stool, no abdominal pain, eats varieties of foods, no hc of UC, nor of Crohn's Dz,   pt has not been compliant with any type of stool softner, currently on no constipation inducing meds, has not been active, metamucil enema miralax, stool softener none has been helping in addition, she also had normal colonoscopy in the past <5 yrs, nl, no fhx of colon cancer denies any nausea vomiting, havign no abd pain       Current Outpatient Medications   Medication Sig Dispense Refill    bumetanide (BUMEX) 1 mg tablet TAKE 1 TABLET BY MOUTH EVERY DAY AS NEEDED FOR SWELLING 30 Tablet 5    triamcinolone acetonide (KENALOG) 0.1 % topical cream Apply  to affected area two (2) times a day. use thin layer for 1 week (Patient not taking: Reported on 11/25/2022) 15 g 0    ergocalciferol (ERGOCALCIFEROL) 1,250 mcg (50,000 unit) capsule Take 1 Capsule by mouth every seven (7) days. (Patient not taking: Reported on 11/25/2022) 13 Capsule 3    phentermine (ADIPEX-P) 37.5 mg tablet Take 1 Tablet by mouth daily. (Patient not taking: Reported on 11/25/2022) 30 Tablet 2    multivitamin (ONE A DAY) tablet Take 1 Tab by mouth daily. (Patient not taking: Reported on 11/25/2022)      ascorbic acid, vitamin C, (VITAMIN C) 250 mg tablet Take 250 mg by mouth daily. (Patient not taking: Reported on 11/25/2022)      vitamin E, dl,tocopheryl acet, (VITAMIN E ACETATE) 400 unit cap capsule Take 1 Cap by mouth daily.  (Patient not taking: Reported on 11/25/2022)  6     No Known Allergies  Past Medical History:   Diagnosis Date    Bipolar disorder ([facility] 11/4/2013    Depression 3/29/2012    Endometriosis 11/20/2012    Iron deficiency anemia 9/27/2019    Obesity 2/23/2015    Severe obesity ([facility] 9/27/2019    Sickle cell trait ([facility]     UTI (lower urinary tract infection) 7/30/2012    Vertigo      Past Surgical History:   Procedure Laterality Date    HX BREAST REDUCTION  2009    In 2010 patient had to have scar tissue removed. HX CHOLECYSTECTOMY  1998    HX DILATION AND CURETTAGE      btl    HX GYN      Tubal ligation, D & C     Family History   Problem Relation Age of Onset    Breast Cancer Cousin     No Known Problems Mother     No Known Problems Father     No Known Problems Sister     No Known Problems Brother      Social History     Tobacco Use    Smoking status: Never    Smokeless tobacco: Never   Substance Use Topics    Alcohol use: Yes     Comment: social/rare      Lab Results   Component Value Date/Time    Cholesterol, total 258 (H) 02/16/2022 01:45 PM    HDL Cholesterol 61 02/16/2022 01:45 PM    LDL, calculated 175.2 (H) 02/16/2022 01:45 PM    Triglyceride 109 02/16/2022 01:45 PM    CHOL/HDL Ratio 4.2 02/16/2022 01:45 PM     Lab Results   Component Value Date/Time    ALT (SGPT) 23 02/16/2022 01:45 PM    Alk. phosphatase 74 02/16/2022 01:45 PM    Bilirubin, total 0.3 02/16/2022 01:45 PM    Albumin 4.0 02/16/2022 01:45 PM    Protein, total 7.7 02/16/2022 01:45 PM    PLATELET 528 72/95/7964 01:45 PM    PLATELET (POC) 507 26/88/2445 02:39 PM        Review of Systems   Constitutional:  Negative for chills and fever. HENT:  Negative for congestion and nosebleeds. Eyes:  Negative for blurred vision and pain. Respiratory:  Negative for cough, shortness of breath and wheezing. Cardiovascular:  Negative for chest pain and leg swelling. Gastrointestinal:  Positive for constipation. Negative for diarrhea, nausea and vomiting. Genitourinary:  Negative for dysuria and frequency. Musculoskeletal:  Negative for joint pain and myalgias. Skin:  Negative for itching and rash. Neurological:  Negative for dizziness, loss of consciousness and headaches. Psychiatric/Behavioral:  Negative for depression. The patient is not nervous/anxious and does not have insomnia. Physical Exam  Constitutional:       Appearance: She is obese. She is not ill-appearing or toxic-appearing. HENT:      Head: Normocephalic and atraumatic. Mouth/Throat:      Mouth: Mucous membranes are moist.   Neurological:      Mental Status: She is alert and oriented to person, place, and time. Psychiatric:         Mood and Affect: Mood normal.         Behavior: Behavior normal.         Thought Content: Thought content normal.         Judgment: Judgment normal.       ASSESSMENT and PLAN    ICD-10-CM ICD-9-CM    1.  Other constipation  K59.09 564.09 linaCLOtide (Linzess) 145 mcg cap capsule        Increase physical acitivity, stool softner such as colace 100 mg one tab tid w/ meals, metamucil powder one cup twice daily, avoid fatty, fast and fried foods, donot miss meals, small meals and more frequent avoid late dinner avoid overeating, increase po fluid intake daily, compliance advised RTC if worsen

## 2022-11-25 NOTE — PROGRESS NOTES
Chief Complaint   Patient presents with    Constipation     1. Have you been to the ER, urgent care clinic since your last visit? Hospitalized since your last visit? No    2. Have you seen or consulted any other health care providers outside of the [address] [place] [address] since your last visit? Include any pap smears or colon screening. No    Call placed to pt. Verified patient with two type of identifiers.

## 2022-12-12 ENCOUNTER — TELEPHONE (OUTPATIENT)
Dept: FAMILY MEDICINE CLINIC | Age: 37
End: 2022-12-12

## 2022-12-12 NOTE — TELEPHONE ENCOUNTER
----- Message from Lyndon Dotson sent at 12/12/2022  2:06 PM EST -----  Subject: Medication Problem    Medication: linaCLOtide (Linzess) 145 mcg cap capsule  Dosage: n/a  Ordering Provider: kemar    Question/Problem: patient states Wolf Alarcon is not covered on her insurance. needs a PA ([facility] pharmacy)      Pharmacy: [address] [place] [address], [address] [place] [address]    ---------------------------------------------------------------------------  --------------  [facility]  [phone];  Do not leave any message, patient will call back for answer  ---------------------------------------------------------------------------  --------------    SCRIPT ANSWERS  Relationship to Patient: Self

## 2022-12-14 ENCOUNTER — DOCUMENTATION ONLY (OUTPATIENT)
Dept: FAMILY MEDICINE CLINIC | Age: 37
End: 2022-12-14

## 2022-12-29 ENCOUNTER — TELEPHONE (OUTPATIENT)
Dept: FAMILY MEDICINE CLINIC | Age: 37
End: 2022-12-29

## 2022-12-29 NOTE — TELEPHONE ENCOUNTER
Patient stated it started a few days ago, stated it doesn't hurt , just doesn't  feel right. Having nausea and dry heaves. Denies any fever. Is able to keep food down. Sched.  For 12/30/2022 @ 2 pm Virtual with Dr. Stephanie Caraballo

## 2022-12-29 NOTE — TELEPHONE ENCOUNTER
Patient would like a call from nurse regarding abdominal pain please reach out to her @ [phone]

## 2022-12-30 ENCOUNTER — VIRTUAL VISIT (OUTPATIENT)
Dept: FAMILY MEDICINE CLINIC | Age: 37
End: 2022-12-30
Payer: MEDICAID

## 2022-12-30 DIAGNOSIS — R11.0 NAUSEA: Primary | ICD-10-CM

## 2022-12-30 RX ORDER — ONDANSETRON 4 MG/1
4 TABLET, ORALLY DISINTEGRATING ORAL
Qty: 12 TABLET | Refills: 0 | Status: SHIPPED | OUTPATIENT
Start: 2022-12-30

## 2022-12-30 NOTE — PROGRESS NOTES
[facility]  [address] Sekou AtkinsonLeandro Vasquez, [address] [place] [address]  [phone]    Joselyn Norton (: 1985) is a 42 y.o. female, established patient, here for evaluation of the following chief complaint(s): Nausea    SUBJECTIVE:    Pt would like to be evaluated for the problem(s) listed above:    Pt states that she has had mild nausea feeling since Noah (2022). States that she just felt not well. No diarrhea. No vomiting. Able to keep food down  but afraid to eat. Has been eating chicken, turkey denney and cheese. No fever, chills, cough, chest pain or sob. States that symptoms have improved. LMP: Beginning of 2022. Review of systems:     A comprehensive review of systems was negative except for that written in the History of Present Illness. PHYSICAL EXAM:    General: alert, cooperative, no distress   Mental  status: mental status: alert, oriented to person, place, and time, normal mood, behavior, speech, dress, motor activity, and thought processes   Resp: resp: normal effort and no respiratory distress   Neuro: neuro: no gross deficits   Skin: skin: no discoloration or lesions of concern on visible areas   Due to this being a TeleHealth evaluation, many elements of the physical examination are unable to be assessed. ASSESSMENT/PLAN:      ICD-10-CM ICD-9-CM    1. Nausea  R11.0 787.02 ondansetron (ZOFRAN ODT) 4 mg disintegrating tablet          1. Nausea  Unclear etiology. Possible gastroenteritis due to food poisoning. Still eating and drinking ok. - RTC if worsening or failure to improve. Return if symptoms worsen or fail to improve. We discussed the expected course, resolution and complications of the diagnosis(es) in detail. Patient was in [place] at the time of consultation. Medication risks, benefits, costs, interactions, and alternatives were discussed as indicated.   I advised her to contact the office if her condition worsens, changes or fails to improve as anticipated. She expressed understanding with the diagnosis(es) and plan. Patient understands that this encounter was a temporary measure, and the importance of further follow up and examination was emphasized. Patient verbalized understanding. Butch Tracey is being evaluated by a Virtual Visit (video visit) encounter to address concerns as mentioned above. A caregiver was present when appropriate. Due to this being a TeleHealth encounter (During [facility] public health emergency), evaluation of the following organ systems was limited: Vitals/Constitutional/EENT/Resp/CV/GI//MS/Neuro/Skin/Heme-Lymph-Imm. Pursuant to the emergency declaration under the [address] [place] [address] authority and the [employer] Act, this Virtual Visit was conducted with patient's (and/or legal guardian's) consent, to reduce the patient's risk of exposure to COVID-19 and provide necessary medical care. The patient (and/or legal guardian) has also been advised to contact this office for worsening conditions or problems, and seek emergency medical treatment and/or call 911 if deemed necessary. Patient identification was verified at the start of the visit: YES    Services were provided through a video synchronous discussion virtually to substitute for in-person clinic visit. Patient was located at home and provider was located in office or at home. An electronic signature was used to authenticate this note. -- Maksim Watters MD     CPT Codes 19873-55584 for Established Patients may apply to this Telehealth Visit. POS code: 18.   Modifier GT

## 2022-12-30 NOTE — PROGRESS NOTES
Name and  Verified. Pharmacy verified     Patient of NP [facility]    Chief Complaint   Patient presents with    Nausea     X 3 days       Patient stated it started a few days ago, stated it doesn't hurt , just doesn't  feel right. Having nausea and dry heaves. Denies any fever. Is able to keep food down. Patient feels a little better but still afraid to eat. 1. Have you been to the ER, urgent care clinic since your last visit? Hospitalized since your last visit? No    2. Have you seen or consulted any other health care providers outside of the [address] [place] [address] since your last visit? Include any pap smears or colon screening.  No

## 2023-02-02 ENCOUNTER — DOCUMENTATION ONLY (OUTPATIENT)
Dept: FAMILY MEDICINE CLINIC | Age: 38
End: 2023-02-02

## 2023-03-01 ENCOUNTER — OFFICE VISIT (OUTPATIENT)
Dept: FAMILY MEDICINE CLINIC | Age: 38
End: 2023-03-01

## 2023-03-01 VITALS
RESPIRATION RATE: 16 BRPM | TEMPERATURE: 98.7 F | HEART RATE: 71 BPM | BODY MASS INDEX: 40.27 KG/M2 | DIASTOLIC BLOOD PRESSURE: 82 MMHG | HEIGHT: 66 IN | OXYGEN SATURATION: 99 % | SYSTOLIC BLOOD PRESSURE: 136 MMHG | WEIGHT: 250.6 LBS

## 2023-03-01 DIAGNOSIS — K59.09 OTHER CONSTIPATION: ICD-10-CM

## 2023-03-01 DIAGNOSIS — J40 BRONCHITIS: Primary | ICD-10-CM

## 2023-03-01 DIAGNOSIS — E66.01 OBESITY, MORBID (HCC): ICD-10-CM

## 2023-03-01 RX ORDER — PHENTERMINE HYDROCHLORIDE 37.5 MG/1
37.5 TABLET ORAL DAILY
Qty: 30 TABLET | Refills: 2 | Status: SHIPPED | OUTPATIENT
Start: 2023-03-01

## 2023-03-01 RX ORDER — PREDNISONE 10 MG/1
TABLET ORAL
Qty: 21 TABLET | Refills: 0 | Status: SHIPPED | OUTPATIENT
Start: 2023-03-01

## 2023-03-01 RX ORDER — AZITHROMYCIN 250 MG/1
TABLET, FILM COATED ORAL
Qty: 6 TABLET | Refills: 0 | Status: SHIPPED | OUTPATIENT
Start: 2023-03-01

## 2023-03-01 RX ORDER — ALBUTEROL SULFATE 90 UG/1
2 AEROSOL, METERED RESPIRATORY (INHALATION)
Qty: 1 EACH | Refills: 1 | Status: SHIPPED | OUTPATIENT
Start: 2023-03-01

## 2023-03-01 RX ORDER — BENZONATATE 200 MG/1
200 CAPSULE ORAL
Qty: 21 CAPSULE | Refills: 0 | Status: SHIPPED | OUTPATIENT
Start: 2023-03-01 | End: 2023-03-08

## 2023-03-01 NOTE — PROGRESS NOTES
Chief Complaint   Patient presents with    Cough     x 2 weeks       1. \"Have you been to the ER, urgent care clinic since your last visit? Hospitalized since your last visit? \" No    2. \"Have you seen or consulted any other health care providers outside of the [address] [place] [address] since your last visit? \" No     3. For patients aged 39-70: Has the patient had a colonoscopy / FIT/ Cologuard? NA - based on age      If the patient is female:    4. For patients aged 41-77: Has the patient had a mammogram within the past 2 years? Yes - Care Gap present. Most recent result on file      5. For patients aged 21-65: Has the patient had a pap smear? No    Pt is her for cough x 2 weeks and shortness of breath. Says her cough was productive originally but it has since turned into a dry, persistent cough. Says she feels short of breath with activities like walking. She has tried multiple medications for her symptoms; Mucinex, dayquil and nyquil with little relief. Requesting chest x-ray today.      Health Maintenance Due   Topic Date Due    COVID-19 Vaccine (1) Never done    Cervical cancer screen  11/20/2017    Flu Vaccine (1) Never done    Depression Monitoring  02/16/2023

## 2023-03-01 NOTE — PROGRESS NOTES
Yolette Hernandez (: 1985) is a 34 y.o. female, established patient, here for evaluation of the following chief complaint(s):  Cough (x 2 weeks)       ASSESSMENT/PLAN:  Below is the assessment and plan developed based on review of pertinent history, physical exam, labs, studies, and medications. 1. Bronchitis  -     azithromycin (ZITHROMAX) 250 mg tablet; Take 2 tablets today, then take 1 tablet daily, Normal, Disp-6 Tablet, R-0  -     predniSONE (STERAPRED DS) 10 mg dose pack; See administration instruction per 10mg dose pack, Normal, Disp-21 Tablet, R-0  -     benzonatate (TESSALON) 200 mg capsule; Take 1 Capsule by mouth three (3) times daily as needed for Cough for up to 7 days. , Normal, Disp-21 Capsule, R-0  -     albuterol (PROVENTIL HFA, VENTOLIN HFA, PROAIR HFA) 90 mcg/actuation inhaler; Take 2 Puffs by inhalation every four (4) hours as needed for Wheezing or Shortness of Breath., Normal, Disp-1 Each, R-1  -     XR CHEST PA LAT; Future  2. Other constipation  -     linaCLOtide (Linzess) 145 mcg cap capsule; Take 1 Capsule by mouth Daily (before breakfast). , Normal, Disp-30 Capsule, R-3  3. Obesity, morbid (HCC)  -     phentermine (ADIPEX-P) 37.5 mg tablet; Take 1 Tablet by mouth daily. , Normal, Disp-30 Tablet, R-2. Follow up 3 months for weight management      Return in about 3 months (around 2023), or if symptoms worsen or fail to improve. SUBJECTIVE/OBJECTIVE:  HPI  patient comes in today for cough  Nonproductive Cough for 2 weeks. Had a few days of productive clesr mucus, but now dry. No fever, chills. Mild shortness of breath. No wheezing. States she did not feel sick. No sick contacts. Home test negative for COVID   Worse when she is moving around. Used albuterol inhaler which helped some. Has used multiple OTC meds without any relief.   No hx of asthma    No Known Allergies    Past Medical History:   Diagnosis Date    Bipolar disorder ([facility] [address] 2013    Depression 3/29/2012 Endometriosis 11/20/2012    Iron deficiency anemia 9/27/2019    Obesity 2/23/2015    Severe obesity ([facility] [address] 9/27/2019    Sickle cell trait ([facility] [address]     UTI (lower urinary tract infection) 7/30/2012    Vertigo        Past Surgical History:   Procedure Laterality Date    HX BREAST REDUCTION  2009    In 2010 patient had to have scar tissue removed. HX CHOLECYSTECTOMY  1998    HX DILATION AND CURETTAGE      btl    HX GYN      Tubal ligation, D & C       Social History     Socioeconomic History    Marital status: SINGLE     Spouse name: Not on file    Number of children: Not on file    Years of education: Not on file    Highest education level: Not on file   Occupational History    Not on file   Tobacco Use    Smoking status: Never    Smokeless tobacco: Never   Vaping Use    Vaping Use: Never used   Substance and Sexual Activity    Alcohol use: Yes     Comment: social/rare    Drug use: No    Sexual activity: Yes     Partners: Male     Birth control/protection: None, Surgical   Other Topics Concern    Not on file   Social History Narrative    3 children, 17y, 14y, 13y. 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 [employer]     Financial Resource Strain: Not on file   Food Insecurity: Not on file   Transportation Needs: Not on file   Physical Activity: Not on file   Stress: Not on file   Social Connections: Not on file   Intimate Partner Violence: Not on file   Housing Stability: Not on file       Family History   Problem Relation Age of Onset    Breast Cancer Cousin     No Known Problems Mother     No Known Problems Father     No Known Problems Sister     No Known Problems Brother        Current Outpatient Medications   Medication Sig    ondansetron (ZOFRAN ODT) 4 mg disintegrating tablet Take 1 Tablet by mouth every eight (8) hours as needed for Nausea or Vomiting.    linaCLOtide (Linzess) 145 mcg cap capsule Take 1 Capsule by mouth Daily (before breakfast).     bumetanide (BUMEX) 1 mg tablet TAKE 1 TABLET BY MOUTH EVERY DAY AS NEEDED FOR SWELLING    triamcinolone acetonide (KENALOG) 0.1 % topical cream Apply  to affected area two (2) times a day. use thin layer for 1 week    phentermine (ADIPEX-P) 37.5 mg tablet Take 1 Tablet by mouth daily. multivitamin (ONE A DAY) tablet Take 1 Tablet by mouth daily. ergocalciferol (ERGOCALCIFEROL) 1,250 mcg (50,000 unit) capsule Take 1 Capsule by mouth every seven (7) days. (Patient not taking: No sig reported)     No current facility-administered medications for this visit. Review of Systems   Constitutional:  Negative for chills, diaphoresis, fatigue and fever. Respiratory:  Positive for cough and shortness of breath. Negative for wheezing. Cardiovascular:  Negative for chest pain, palpitations and leg swelling. Gastrointestinal:  Positive for constipation. Negative for diarrhea, nausea and vomiting. Genitourinary: Negative. Musculoskeletal:  Negative for myalgias. Skin: Negative. Neurological:  Negative for dizziness and headaches. Vitals:    03/01/23 0951 03/01/23 1030   BP: (!) 147/88 136/82   Pulse: 71    Resp: 16    Temp: 98.7 °F (37.1 °C)    TempSrc: Oral    SpO2: 99%    Weight: 250 lb 9.6 oz (113.7 kg)    Height: 5' 6\" (1.676 m)        Physical Exam  Vitals reviewed. Constitutional:       Appearance: She is well-developed. HENT:      Right Ear: Hearing, tympanic membrane, ear canal and external ear normal.      Left Ear: Hearing, tympanic membrane, ear canal and external ear normal.      Nose: Nose normal.      Right Sinus: No maxillary sinus tenderness or frontal sinus tenderness. Left Sinus: No maxillary sinus tenderness or frontal sinus tenderness. Mouth/Throat:      Mouth: Mucous membranes are not pale and not dry. Pharynx: Uvula midline. No oropharyngeal exudate or posterior oropharyngeal erythema. Cardiovascular:      Rate and Rhythm: Normal rate and regular rhythm.       Heart sounds: Normal heart sounds, S1 normal and S2 normal.   Pulmonary:      Effort: Pulmonary effort is normal.      Breath sounds: Normal breath sounds. No decreased breath sounds, wheezing, rhonchi or rales. Comments: BRONCHOSPASTIC COUGH  Lymphadenopathy:      Head:      Right side of head: No submental, submandibular, tonsillar, preauricular or posterior auricular adenopathy. Left side of head: No submental, submandibular, tonsillar, preauricular or posterior auricular adenopathy. Cervical: No cervical adenopathy. Upper Body:      Right upper body: No supraclavicular adenopathy. Left upper body: No supraclavicular adenopathy. Skin:     General: Skin is warm and dry. Neurological:      Mental Status: She is alert and oriented to person, place, and time. Psychiatric:         Speech: Speech normal.         Behavior: Behavior normal. Behavior is cooperative. Thought Content: Thought content normal.     On this date 03/01/2023 I have spent 25 minutes reviewing previous notes, test results and face to face with the patient discussing the diagnosis and importance of compliance with the treatment plan as well as documenting on the day of the visit. An electronic signature was used to authenticate this note.   -- Magnolia Laura NP

## 2023-05-16 ENCOUNTER — TELEPHONE (OUTPATIENT)
Age: 38
End: 2023-05-16

## 2023-05-16 DIAGNOSIS — G44.201 ACUTE INTRACTABLE TENSION-TYPE HEADACHE: Primary | ICD-10-CM

## 2023-05-16 RX ORDER — BUTALBITAL, ACETAMINOPHEN AND CAFFEINE 50; 325; 40 MG/1; MG/1; MG/1
1 TABLET ORAL EVERY 6 HOURS PRN
Qty: 60 TABLET | Refills: 0 | Status: SHIPPED | OUTPATIENT
Start: 2023-05-16

## 2023-05-16 RX ORDER — BUTALBITAL, ACETAMINOPHEN AND CAFFEINE 50; 325; 40 MG/1; MG/1; MG/1
1 TABLET ORAL EVERY 6 HOURS PRN
Qty: 60 TABLET | Refills: 0 | Status: SHIPPED | OUTPATIENT
Start: 2023-05-16 | End: 2023-05-16

## 2023-05-16 NOTE — TELEPHONE ENCOUNTER
Sent prescription for Fioricet  Orders Placed This Encounter    butalbital-acetaminophen-caffeine (FIORICET, ESGIC) -40 MG per tablet     Sig: Take 1 tablet by mouth every 6 hours as needed for Headaches No more than 15 tabs per week     Dispense:  60 tablet     Refill:  0     Work note provided. If she is having the worst headache she has ever had, she should go to ED for evaluation and imaging.   Patient to work on stress reduction, which has been a trigger in the past.

## 2023-05-16 NOTE — TELEPHONE ENCOUNTER
Patient stated she's been having excruciating migraines for x1 week. Has been taking Excedrin around the clock that rarely works. Would like a work note for today and the next few days. Have not been to urgent care, or emergency dept.

## 2023-05-16 NOTE — TELEPHONE ENCOUNTER
Patient states that she need a doctor note as soon as possible and would like for nurse to give her a call as milan the reason why she can be reached @ [phone]

## 2023-06-15 PROBLEM — G43.009 MIGRAINE WITHOUT AURA AND WITHOUT STATUS MIGRAINOSUS, NOT INTRACTABLE: Status: ACTIVE | Noted: 2023-06-15

## 2023-06-15 PROBLEM — E78.5 HYPERLIPIDEMIA: Status: ACTIVE | Noted: 2023-06-15

## 2023-06-15 PROBLEM — E55.9 VITAMIN D DEFICIENCY, UNSPECIFIED: Status: ACTIVE | Noted: 2023-06-15

## 2023-06-15 PROBLEM — F43.25 ADJUSTMENT DISORDER WITH MIXED DISTURBANCE OF EMOTIONS AND CONDUCT: Status: ACTIVE | Noted: 2023-06-15

## 2023-06-28 ENCOUNTER — TELEPHONE (OUTPATIENT)
Age: 38
End: 2023-06-28

## 2023-06-28 DIAGNOSIS — K59.04 CHRONIC IDIOPATHIC CONSTIPATION: Primary | ICD-10-CM

## 2023-07-03 ENCOUNTER — TELEPHONE (OUTPATIENT)
Age: 38
End: 2023-07-03

## 2023-07-03 DIAGNOSIS — K59.04 CHRONIC IDIOPATHIC CONSTIPATION: ICD-10-CM

## 2023-07-03 NOTE — TELEPHONE ENCOUNTER
Patient would like to know if Sigrid Cardozo can give her a stronger dose of linaclotide (LINZESS) 290 MCG CAPS capsule and also discuss her lab results she can be reached @ [phone]

## 2023-07-03 NOTE — TELEPHONE ENCOUNTER
Linzess 290mcg is the highest dose. If that is not helping, she may need to see GI    I had sent this to Ta Powell last week. Not sure if she called patient with results:  Thyroid screen negative. Liver and kidney function normal.   HIV, Syphilis, and STD screening negative. Vitamin D is low. Vitamin D helps your body absorb calcium, one of the main building blocks for strong bones. Together with calcium, vitamin D helps protect you from developing osteoporosis, a disease that thins and weakens the bones and makes them more likely to break. Your body needs vitamin D for other functions too. Your muscles need it to move, and your nerves need it to carry messages between your brain and your body. Your immune system needs vitamin D to fight off invading bacteria and viruses. Take prescription for Vitamin D 50,000 units to take once weekly. Cholesterol numbers elevated. According to the American Heart Association and the [employer] of Cardiology, your 10-year risk of a heart attack or stroke is 0.8%. They do not recommend treatment with cholesterol medication until that risk is above 7.5%. Eat heart-healthy foods  A few changes in your diet can reduce cholesterol and improve your heart health:      Reduce saturated fats. Saturated fats, found primarily in red meat and full-fat dairy products, raise your total cholesterol. Decreasing your consumption of saturated fats can reduce your low-density lipoprotein (LDL) cholesterol - the \"bad\" cholesterol. Eat foods rich in omega-3 fatty acids. Omega-3 fatty acids don't affect LDL cholesterol. But they have other heart-healthy benefits, including reducing blood pressure. Foods with omega-3 fatty acids include salmon, mackerel, herring, walnuts and flaxseeds. Increase soluble fiber.  Soluble fiber can reduce the absorption of cholesterol into your bloodstream. Soluble fiber is found in such foods as oatmeal, kidney beans, Liberal sprouts, apples and

## 2023-07-28 ENCOUNTER — OFFICE VISIT (OUTPATIENT)
Age: 38
End: 2023-07-28

## 2023-07-28 VITALS — BODY MASS INDEX: 37.06 KG/M2 | WEIGHT: 229.6 LBS

## 2023-07-28 DIAGNOSIS — D50.9 IRON DEFICIENCY ANEMIA, UNSPECIFIED IRON DEFICIENCY ANEMIA TYPE: Primary | ICD-10-CM

## 2023-07-28 NOTE — PROGRESS NOTES
Name and Date of Birth Verified    Pharmacy Verified    Chief Complaint   Patient presents with    Labs Only     Per Zulay Mooney NP

## 2023-07-31 ENCOUNTER — TELEPHONE (OUTPATIENT)
Age: 38
End: 2023-07-31

## 2023-07-31 LAB
BASOPHILS # BLD AUTO: 0 X10E3/UL (ref 0–0.2)
BASOPHILS NFR BLD AUTO: 1 %
EOSINOPHIL # BLD AUTO: 0 X10E3/UL (ref 0–0.4)
EOSINOPHIL NFR BLD AUTO: 1 %
ERYTHROCYTE [DISTWIDTH] IN BLOOD BY AUTOMATED COUNT: 19 % (ref 11.7–15.4)
HCT VFR BLD AUTO: 33 % (ref 34–46.6)
HGB BLD-MCNC: 9.8 G/DL (ref 11.1–15.9)
IMM GRANULOCYTES # BLD AUTO: 0 X10E3/UL (ref 0–0.1)
IMM GRANULOCYTES NFR BLD AUTO: 1 %
IRON SATN MFR SERPL: 14 % (ref 15–55)
IRON SERPL-MCNC: 76 UG/DL (ref 27–159)
LYMPHOCYTES # BLD AUTO: 1.1 X10E3/UL (ref 0.7–3.1)
LYMPHOCYTES NFR BLD AUTO: 28 %
MCH RBC QN AUTO: 20.4 PG (ref 26.6–33)
MCHC RBC AUTO-ENTMCNC: 29.7 G/DL (ref 31.5–35.7)
MCV RBC AUTO: 69 FL (ref 79–97)
MONOCYTES # BLD AUTO: 0.3 X10E3/UL (ref 0.1–0.9)
MONOCYTES NFR BLD AUTO: 8 %
NEUTROPHILS # BLD AUTO: 2.4 X10E3/UL (ref 1.4–7)
NEUTROPHILS NFR BLD AUTO: 61 %
PLATELET # BLD AUTO: 460 X10E3/UL (ref 150–450)
RBC # BLD AUTO: 4.8 X10E6/UL (ref 3.77–5.28)
TIBC SERPL-MCNC: 529 UG/DL (ref 250–450)
UIBC SERPL-MCNC: 453 UG/DL (ref 131–425)
WBC # BLD AUTO: 3.8 X10E3/UL (ref 3.4–10.8)

## 2023-07-31 NOTE — TELEPHONE ENCOUNTER
Pt came in office today about her blood work and would like someone to call her about her labs and explains whats going on.   CB# [phone]

## 2023-08-01 NOTE — TELEPHONE ENCOUNTER
Verified patient with two type of identifiers. Spoke to pt - made her aware I will call back when PCP has reviewed her lab results. She verbalized understanding.

## 2023-08-03 ENCOUNTER — TELEPHONE (OUTPATIENT)
Age: 38
End: 2023-08-03

## 2023-08-03 DIAGNOSIS — D50.9 IRON DEFICIENCY ANEMIA, UNSPECIFIED IRON DEFICIENCY ANEMIA TYPE: Primary | ICD-10-CM

## 2023-08-03 NOTE — TELEPHONE ENCOUNTER
----- Message from THANH Ferguson NP sent at 8/1/2023  8:41 PM EDT -----  Labs still show iron deficiency anemia.   I recommend either iron supplements 2-3 times daily or referral to hematology for iron infusions and to see her GYN to dsicuss options for heavy menses

## 2023-08-03 NOTE — TELEPHONE ENCOUNTER
Verified patient with two type of identifiers. Spoke to pt - reviewed lab results per THANH Camarillo NP. Pt verbalized understanding.

## 2023-08-03 NOTE — TELEPHONE ENCOUNTER
Orders Placed This Encounter    Kimberlyn Leonardo MD, Hematology/Oncology, [place]     Referral Priority:   Routine     Referral Type:   Eval and Treat     Referral Reason:   Specialty Services Required     Referred to Provider:   Orlando Portillo MD     Requested Specialty:   Hematology and Oncology     Number of Visits Requested:   1

## 2023-08-04 NOTE — TELEPHONE ENCOUNTER
Electronically FAXED office note, referral and labs to Dr Thibodeaux  office ([phone]). Completed per [employer].

## 2023-08-07 ENCOUNTER — TELEPHONE (OUTPATIENT)
Age: 38
End: 2023-08-07

## 2023-08-07 NOTE — TELEPHONE ENCOUNTER
Pt clld because she is having trouble breathing at night. She thinks it has something to do with the iron pills. Would like to move appt up sooner to discuss this issue, and meds. But would like to speak with nurse or  first via phone.

## 2023-08-07 NOTE — TELEPHONE ENCOUNTER
This is a new pt. We can not give her any medical information without seeing her. She will need to reach out to her PCP regarding this. Can see if she can get in with  or another provider sooner than .

## 2023-08-14 NOTE — PROGRESS NOTES
Ana Godinez is a 34 y.o. female here for new patient appt for ARCHIE. She does have heavy menses. She has never had blood or iron transfusion before. She has been taking iron supplements for 1-2 months. Ordered Blood Builder and B12 supplements. She does have fatigue. 1. Have you been to the ER, urgent care clinic since your last visit? Hospitalized since your last visit? New Pt    2. Have you seen or consulted any other health care providers outside of the [address] [place] [address] since your last visit? Include any pap smears or colon screening.  New Pt

## 2023-08-16 ENCOUNTER — OFFICE VISIT (OUTPATIENT)
Age: 38
End: 2023-08-16
Payer: MEDICAID

## 2023-08-16 VITALS
TEMPERATURE: 98.2 F | SYSTOLIC BLOOD PRESSURE: 121 MMHG | WEIGHT: 235 LBS | BODY MASS INDEX: 37.77 KG/M2 | HEIGHT: 66 IN | HEART RATE: 68 BPM | DIASTOLIC BLOOD PRESSURE: 81 MMHG | OXYGEN SATURATION: 98 %

## 2023-08-16 DIAGNOSIS — D50.0 IRON DEFICIENCY ANEMIA DUE TO CHRONIC BLOOD LOSS: Primary | ICD-10-CM

## 2023-08-16 PROCEDURE — 99205 OFFICE O/P NEW HI 60 MIN: CPT | Performed by: INTERNAL MEDICINE

## 2023-08-16 NOTE — PROGRESS NOTES
Lucía  at [facility], [address], [facility], [address] [place], [place] [address]  [phone]    Hematology Note        Patient: Magnus Murphy MRN: [id number]  SSN: [id number]    YOB: 1985  Age: 34 y.o. Sex: female      Subjective:      Magnus Murphy is a 34 y.o. female who who I am seeing in consultation for iron deficiency anemia. She has suffered with iron deficiency anemia for over a yr. She feels fatigued. Denies active rectal bleeding. He does not tolerate oral iron supplement due to constipation and nausea. She experiences heavy menstrual periods      Review of Systems:  Constitutional: negative  Eyes: negative  Ears, Nose, Mouth, Throat, and Face: negative  Respiratory: negative  Cardiovascular: negative  Gastrointestinal: negative  Genitourinary:negative  Integument/Breast: negative  Hematologic/Lymphatic: negative  Musculoskeletal:negative  Neurological: negative      Past Medical History:   Diagnosis Date    Bipolar disorder ([address]) 11/4/2013    Depression 3/29/2012    Endometriosis 11/20/2012    Hyperlipidemia 6/15/2023    Iron deficiency anemia 9/27/2019    Migraine without aura and without status migrainosus, not intractable 6/15/2023    Obesity 2/23/2015    Severe obesity ([address]) 9/27/2019    Sickle cell trait ([address])     Vitamin D deficiency, unspecified 6/15/2023     Past Surgical History:   Procedure Laterality Date    BREAST REDUCTION SURGERY  2009    In 2010 patient had to have scar tissue removed. CHOLECYSTECTOMY  1998    DILATION AND CURETTAGE OF UTERUS      btl    GYN      Tubal ligation, D & C      Family History   Problem Relation Age of Onset    No Known Problems Sister     No Known Problems Father     Breast Cancer Cousin     No Known Problems Mother     No Known Problems Brother      Social History     Tobacco Use    Smoking status: Never    Smokeless tobacco: Never   Substance Use Topics    Alcohol use:  Yes

## 2023-09-06 ENCOUNTER — TELEPHONE (OUTPATIENT)
Age: 38
End: 2023-09-06

## 2023-09-06 DIAGNOSIS — D50.0 IRON DEFICIENCY ANEMIA DUE TO CHRONIC BLOOD LOSS: Primary | ICD-10-CM

## 2023-09-06 RX ORDER — ALBUTEROL SULFATE 90 UG/1
4 AEROSOL, METERED RESPIRATORY (INHALATION) PRN
Status: CANCELLED | OUTPATIENT
Start: 2023-09-13

## 2023-09-06 RX ORDER — EPINEPHRINE 1 MG/ML
0.3 INJECTION, SOLUTION, CONCENTRATE INTRAVENOUS PRN
Status: CANCELLED | OUTPATIENT
Start: 2023-09-13

## 2023-09-06 RX ORDER — SODIUM CHLORIDE 9 MG/ML
5-250 INJECTION, SOLUTION INTRAVENOUS PRN
Status: CANCELLED | OUTPATIENT
Start: 2023-09-13

## 2023-09-06 RX ORDER — HEPARIN SODIUM (PORCINE) LOCK FLUSH IV SOLN 100 UNIT/ML 100 UNIT/ML
500 SOLUTION INTRAVENOUS PRN
Status: CANCELLED | OUTPATIENT
Start: 2023-09-13

## 2023-09-06 RX ORDER — SODIUM CHLORIDE 0.9 % (FLUSH) 0.9 %
5-40 SYRINGE (ML) INJECTION PRN
Status: CANCELLED | OUTPATIENT
Start: 2023-09-13

## 2023-09-06 RX ORDER — SODIUM CHLORIDE 9 MG/ML
INJECTION, SOLUTION INTRAVENOUS CONTINUOUS
Status: CANCELLED | OUTPATIENT
Start: 2023-09-13

## 2023-09-06 RX ORDER — ONDANSETRON 2 MG/ML
8 INJECTION INTRAMUSCULAR; INTRAVENOUS
Status: CANCELLED | OUTPATIENT
Start: 2023-09-13

## 2023-09-06 RX ORDER — DIPHENHYDRAMINE HYDROCHLORIDE 50 MG/ML
50 INJECTION INTRAMUSCULAR; INTRAVENOUS
Status: CANCELLED | OUTPATIENT
Start: 2023-09-13

## 2023-09-06 RX ORDER — FAMOTIDINE 10 MG/ML
20 INJECTION, SOLUTION INTRAVENOUS
Status: CANCELLED | OUTPATIENT
Start: 2023-09-13

## 2023-09-06 RX ORDER — ACETAMINOPHEN 325 MG/1
650 TABLET ORAL
Status: CANCELLED | OUTPATIENT
Start: 2023-09-13

## 2023-09-06 NOTE — TELEPHONE ENCOUNTER
Patient called inquiring infusion appointments. No plan/ order in. Please place order.   [facility]

## 2023-09-07 NOTE — TELEPHONE ENCOUNTER
Returned call to pt. HIPAA verified by two patient identifiers. She is nervous about infusions at this time. She said she was on the iron supplements and when she stopped it she did notice a big difference in energy so she just restarted taking it. I asked her to resume it x 1 month and recheck labs in 1 month in [facility] 2 and then we can decide how well the oral is working for her. Pt said she has constipation issues and is on linzess which she doesn't like to take it everyday but she is comfortable with taking the oral iron pills and agree with the plan of continuing to take and check labs in 1 month. ANNABEL FROM DR Kennedy Portal CBC WITH DIFF FERRITIN IRON PROFILE.

## 2023-10-06 DIAGNOSIS — D50.0 IRON DEFICIENCY ANEMIA DUE TO CHRONIC BLOOD LOSS: ICD-10-CM

## 2023-10-07 LAB
BASOPHILS # BLD AUTO: 0 X10E3/UL (ref 0–0.2)
BASOPHILS NFR BLD AUTO: 1 %
EOSINOPHIL # BLD AUTO: 0.1 X10E3/UL (ref 0–0.4)
EOSINOPHIL NFR BLD AUTO: 1 %
ERYTHROCYTE [DISTWIDTH] IN BLOOD BY AUTOMATED COUNT: 21.1 % (ref 11.7–15.4)
FERRITIN SERPL-MCNC: 13 NG/ML (ref 15–150)
HCT VFR BLD AUTO: 36.1 % (ref 34–46.6)
HGB BLD-MCNC: 11.2 G/DL (ref 11.1–15.9)
IMM GRANULOCYTES # BLD AUTO: 0 X10E3/UL (ref 0–0.1)
IMM GRANULOCYTES NFR BLD AUTO: 0 %
IRON SATN MFR SERPL: 31 % (ref 15–55)
IRON SERPL-MCNC: 132 UG/DL (ref 27–159)
LYMPHOCYTES # BLD AUTO: 1.8 X10E3/UL (ref 0.7–3.1)
LYMPHOCYTES NFR BLD AUTO: 41 %
MCH RBC QN AUTO: 23.9 PG (ref 26.6–33)
MCHC RBC AUTO-ENTMCNC: 31 G/DL (ref 31.5–35.7)
MCV RBC AUTO: 77 FL (ref 79–97)
MONOCYTES # BLD AUTO: 0.4 X10E3/UL (ref 0.1–0.9)
MONOCYTES NFR BLD AUTO: 9 %
NEUTROPHILS # BLD AUTO: 2 X10E3/UL (ref 1.4–7)
NEUTROPHILS NFR BLD AUTO: 48 %
PLATELET # BLD AUTO: 285 X10E3/UL (ref 150–450)
RBC # BLD AUTO: 4.68 X10E6/UL (ref 3.77–5.28)
TIBC SERPL-MCNC: 431 UG/DL (ref 250–450)
UIBC SERPL-MCNC: 299 UG/DL (ref 131–425)
WBC # BLD AUTO: 4.2 X10E3/UL (ref 3.4–10.8)

## 2023-10-11 ENCOUNTER — TELEPHONE (OUTPATIENT)
Age: 38
End: 2023-10-11

## 2023-10-11 DIAGNOSIS — D50.0 IRON DEFICIENCY ANEMIA DUE TO CHRONIC BLOOD LOSS: Primary | ICD-10-CM

## 2023-10-12 RX ORDER — DOXYCYCLINE HYCLATE 50 MG/1
324 CAPSULE, GELATIN COATED ORAL DAILY
Qty: 30 TABLET | Refills: 3 | Status: SHIPPED | OUTPATIENT
Start: 2023-10-12

## 2023-10-12 RX ORDER — ASCORBIC ACID 500 MG
500 TABLET ORAL DAILY
Qty: 30 TABLET | Refills: 3 | Status: SHIPPED | OUTPATIENT
Start: 2023-10-12

## 2023-10-12 NOTE — TELEPHONE ENCOUNTER
Called pt. HIPAA verified by two patient identifiers. Pt has been taking some iron pills off of Moraima Arndtine but she isn't sure the dosage. She agrees to be started on iron that we will send the pharmacy and will also send vitamin c for her to take daily but can instead do orange juice if prefers. Also educated her on stool softener she can get OTC. Get labs 1 month from starting prescribed iron which we will mail the slips to her.      VORB FROM DR Suzanne Wang CBC WITH DIFF IRON PROFILE FERRITIN    Approved per ANNABEL from [address]  Requested Prescriptions     Pending Prescriptions Disp Refills    vitamin C (ASCORBIC ACID) 500 MG tablet 30 tablet 3     Sig: Take 1 tablet by mouth daily On an empty stomach    ferrous gluconate (FERGON) 324 (38 Fe) MG tablet 30 tablet 3     Sig: Take 1 tablet by mouth daily On empty stomach with vitamin c

## 2023-11-17 DIAGNOSIS — D50.0 IRON DEFICIENCY ANEMIA DUE TO CHRONIC BLOOD LOSS: ICD-10-CM

## 2023-12-01 ENCOUNTER — APPOINTMENT (OUTPATIENT)
Facility: HOSPITAL | Age: 38
End: 2023-12-01
Payer: MEDICAID

## 2023-12-01 ENCOUNTER — HOSPITAL ENCOUNTER (EMERGENCY)
Facility: HOSPITAL | Age: 38
Discharge: HOME | End: 2023-12-01
Payer: MEDICAID

## 2023-12-01 VITALS
RESPIRATION RATE: 19 BRPM | WEIGHT: 230 LBS | HEART RATE: 68 BPM | HEIGHT: 67 IN | TEMPERATURE: 98.4 F | BODY MASS INDEX: 36.1 KG/M2 | DIASTOLIC BLOOD PRESSURE: 58 MMHG | SYSTOLIC BLOOD PRESSURE: 116 MMHG | OXYGEN SATURATION: 95 %

## 2023-12-01 DIAGNOSIS — D25.9 UTERINE LEIOMYOMA, UNSPECIFIED LOCATION: ICD-10-CM

## 2023-12-01 DIAGNOSIS — R10.30 LOWER ABDOMINAL PAIN: ICD-10-CM

## 2023-12-01 DIAGNOSIS — K57.32 DIVERTICULITIS OF COLON: Primary | ICD-10-CM

## 2023-12-01 LAB
ALBUMIN SERPL-MCNC: 3 G/DL (ref 3.5–5)
ALBUMIN/GLOB SERPL: 0.9 (ref 1.1–2.2)
ALP SERPL-CCNC: 62 U/L (ref 45–117)
ALT SERPL-CCNC: 15 U/L (ref 12–78)
ANION GAP SERPL CALC-SCNC: 5 MMOL/L (ref 5–15)
APPEARANCE UR: CLEAR
AST SERPL-CCNC: 18 U/L (ref 15–37)
BACTERIA URNS QL MICRO: NEGATIVE /HPF
BASOPHILS # BLD: 0.1 K/UL (ref 0–0.1)
BASOPHILS NFR BLD: 1 % (ref 0–1)
BILIRUB SERPL-MCNC: 0.4 MG/DL (ref 0.2–1)
BILIRUB UR QL: NEGATIVE
BUN SERPL-MCNC: 14 MG/DL (ref 6–20)
BUN/CREAT SERPL: 14 (ref 12–20)
CALCIUM SERPL-MCNC: 7.9 MG/DL (ref 8.5–10.1)
CHLORIDE SERPL-SCNC: 107 MMOL/L (ref 97–108)
CO2 SERPL-SCNC: 26 MMOL/L (ref 21–32)
COLOR UR: ABNORMAL
CREAT SERPL-MCNC: 0.99 MG/DL (ref 0.55–1.02)
DIFFERENTIAL METHOD BLD: ABNORMAL
EOSINOPHIL # BLD: 0.1 K/UL (ref 0–0.4)
EOSINOPHIL NFR BLD: 1 % (ref 0–7)
EPITH CASTS URNS QL MICRO: ABNORMAL /LPF
ERYTHROCYTE [DISTWIDTH] IN BLOOD BY AUTOMATED COUNT: 17.5 % (ref 11.5–14.5)
GLOBULIN SER CALC-MCNC: 3.3 G/DL (ref 2–4)
GLUCOSE SERPL-MCNC: 99 MG/DL (ref 65–100)
GLUCOSE UR STRIP.AUTO-MCNC: NEGATIVE MG/DL
HCG UR QL: NEGATIVE
HCT VFR BLD AUTO: 31.2 % (ref 35–47)
HGB BLD-MCNC: 10.2 G/DL (ref 11.5–16)
HGB UR QL STRIP: ABNORMAL
HYALINE CASTS URNS QL MICRO: ABNORMAL /LPF (ref 0–2)
IMM GRANULOCYTES # BLD AUTO: 0.1 K/UL (ref 0–0.04)
IMM GRANULOCYTES NFR BLD AUTO: 1 % (ref 0–0.5)
KETONES UR QL STRIP.AUTO: NEGATIVE MG/DL
LEUKOCYTE ESTERASE UR QL STRIP.AUTO: NEGATIVE
LIPASE SERPL-CCNC: 23 U/L (ref 13–75)
LYMPHOCYTES # BLD: 1.3 K/UL (ref 0.8–3.5)
LYMPHOCYTES NFR BLD: 14 % (ref 12–49)
MCH RBC QN AUTO: 25.2 PG (ref 26–34)
MCHC RBC AUTO-ENTMCNC: 32.7 G/DL (ref 30–36.5)
MCV RBC AUTO: 77 FL (ref 80–99)
MONOCYTES # BLD: 0.6 K/UL (ref 0–1)
MONOCYTES NFR BLD: 6 % (ref 5–13)
NEUTS SEG # BLD: 7.5 K/UL (ref 1.8–8)
NEUTS SEG NFR BLD: 77 % (ref 32–75)
NITRITE UR QL STRIP.AUTO: NEGATIVE
NRBC # BLD: 0 K/UL (ref 0–0.01)
NRBC BLD-RTO: 0 PER 100 WBC
PH UR STRIP: 6 (ref 5–8)
PLATELET # BLD AUTO: 321 K/UL (ref 150–400)
PMV BLD AUTO: 11.8 FL (ref 8.9–12.9)
POTASSIUM SERPL-SCNC: 3.6 MMOL/L (ref 3.5–5.1)
PROT SERPL-MCNC: 6.3 G/DL (ref 6.4–8.2)
PROT UR STRIP-MCNC: ABNORMAL MG/DL
RBC # BLD AUTO: 4.05 M/UL (ref 3.8–5.2)
RBC #/AREA URNS HPF: ABNORMAL /HPF (ref 0–5)
SODIUM SERPL-SCNC: 138 MMOL/L (ref 136–145)
SP GR UR REFRACTOMETRY: 1.02
SPECIMEN HOLD: NORMAL
UROBILINOGEN UR QL STRIP.AUTO: 0.2 EU/DL (ref 0.2–1)
WBC # BLD AUTO: 9.6 K/UL (ref 3.6–11)
WBC URNS QL MICRO: ABNORMAL /HPF (ref 0–4)

## 2023-12-01 PROCEDURE — 96374 THER/PROPH/DIAG INJ IV PUSH: CPT

## 2023-12-01 PROCEDURE — 6360000002 HC RX W HCPCS

## 2023-12-01 PROCEDURE — 6360000004 HC RX CONTRAST MEDICATION

## 2023-12-01 PROCEDURE — 99285 EMERGENCY DEPT VISIT HI MDM: CPT

## 2023-12-01 PROCEDURE — 96375 TX/PRO/DX INJ NEW DRUG ADDON: CPT

## 2023-12-01 PROCEDURE — 81001 URINALYSIS AUTO W/SCOPE: CPT

## 2023-12-01 PROCEDURE — 36415 COLL VENOUS BLD VENIPUNCTURE: CPT

## 2023-12-01 PROCEDURE — 76830 TRANSVAGINAL US NON-OB: CPT

## 2023-12-01 PROCEDURE — 83690 ASSAY OF LIPASE: CPT

## 2023-12-01 PROCEDURE — 85025 COMPLETE CBC W/AUTO DIFF WBC: CPT

## 2023-12-01 PROCEDURE — 80053 COMPREHEN METABOLIC PANEL: CPT

## 2023-12-01 PROCEDURE — 6370000000 HC RX 637 (ALT 250 FOR IP)

## 2023-12-01 PROCEDURE — 74177 CT ABD & PELVIS W/CONTRAST: CPT

## 2023-12-01 PROCEDURE — 81025 URINE PREGNANCY TEST: CPT

## 2023-12-01 RX ORDER — KETOROLAC TROMETHAMINE 30 MG/ML
30 INJECTION, SOLUTION INTRAMUSCULAR; INTRAVENOUS
Status: COMPLETED | OUTPATIENT
Start: 2023-12-01 | End: 2023-12-01

## 2023-12-01 RX ORDER — IBUPROFEN 600 MG/1
600 TABLET ORAL EVERY 8 HOURS PRN
Qty: 30 TABLET | Refills: 0 | Status: SHIPPED | OUTPATIENT
Start: 2023-12-01

## 2023-12-01 RX ORDER — AMOXICILLIN AND CLAVULANATE POTASSIUM 875; 125 MG/1; MG/1
1 TABLET, FILM COATED ORAL
Status: COMPLETED | OUTPATIENT
Start: 2023-12-01 | End: 2023-12-01

## 2023-12-01 RX ORDER — AMOXICILLIN AND CLAVULANATE POTASSIUM 875; 125 MG/1; MG/1
1 TABLET, FILM COATED ORAL 2 TIMES DAILY
Qty: 20 TABLET | Refills: 0 | Status: SHIPPED | OUTPATIENT
Start: 2023-12-01 | End: 2023-12-11

## 2023-12-01 RX ORDER — HYDROCODONE BITARTRATE AND ACETAMINOPHEN 5; 325 MG/1; MG/1
1 TABLET ORAL EVERY 8 HOURS PRN
Qty: 9 TABLET | Refills: 0 | Status: SHIPPED | OUTPATIENT
Start: 2023-12-01 | End: 2023-12-04

## 2023-12-01 RX ORDER — MORPHINE SULFATE 4 MG/ML
4 INJECTION, SOLUTION INTRAMUSCULAR; INTRAVENOUS
Status: COMPLETED | OUTPATIENT
Start: 2023-12-01 | End: 2023-12-01

## 2023-12-01 RX ORDER — HYDROCODONE BITARTRATE AND ACETAMINOPHEN 5; 325 MG/1; MG/1
1 TABLET ORAL
Status: COMPLETED | OUTPATIENT
Start: 2023-12-01 | End: 2023-12-01

## 2023-12-01 RX ADMIN — AMOXICILLIN AND CLAVULANATE POTASSIUM 1 TABLET: 875; 125 TABLET, FILM COATED ORAL at 21:54

## 2023-12-01 RX ADMIN — IOPAMIDOL 100 ML: 755 INJECTION, SOLUTION INTRAVENOUS at 21:13

## 2023-12-01 RX ADMIN — KETOROLAC TROMETHAMINE 30 MG: 30 INJECTION, SOLUTION INTRAMUSCULAR; INTRAVENOUS at 20:30

## 2023-12-01 RX ADMIN — HYDROCODONE BITARTRATE AND ACETAMINOPHEN 1 TABLET: 5; 325 TABLET ORAL at 20:23

## 2023-12-01 RX ADMIN — MORPHINE SULFATE 4 MG: 4 INJECTION, SOLUTION INTRAMUSCULAR; INTRAVENOUS at 21:54

## 2023-12-01 ASSESSMENT — PAIN SCALES - GENERAL
PAINLEVEL_OUTOF10: 10
PAINLEVEL_OUTOF10: 7

## 2023-12-01 ASSESSMENT — PAIN DESCRIPTION - LOCATION
LOCATION: ABDOMEN
LOCATION: ABDOMEN

## 2023-12-01 ASSESSMENT — PAIN DESCRIPTION - ORIENTATION: ORIENTATION: MID

## 2023-12-02 NOTE — ED PROVIDER NOTES
[facility] EMERGENCY DEPT  EMERGENCY DEPARTMENT ENCOUNTER       Pt Name: Jemima Poon  MRN: [id number]  [address] 1985  Date of evaluation: 12/1/2023  Provider: Jewels Patel PA-C   PCP: THANH Rodriguez NP  Note Started: 9:16 PM EST 12/1/23     CHIEF COMPLAINT       Chief Complaint   Patient presents with    Abdominal Pain     Pt has lower abdominal pain. Pt reports pain worse in last two days. Ob gyn wants to do a Ultrasound, to check a problem with her uterus. But was due to wait after the period ended. HISTORY OF PRESENT ILLNESS: 1 or more elements      History From: Patient  HPI Limitations: None     Jemima Poon is a 34 y.o. female past medical history of bipolar, depression, endometriosis, iron deficiency anemia, migraine who presents complaining of lower abdominal pain x 3 days. She is complaining of 10 out of 10 central lower abdominal pain that has been constant for the last 3 days. She states that actually caused her menstrual cycle approximately 3 days ago. Patient reports she has been experiencing intermittent lower abdominal pain for the last 4 months, reports she saw an OB/GYN and is scheduled to have an ultrasound soon however she states the pain became so unbearable that she was unable to wait for her appointment. She reports the pain over the last few days feels different and is worse than the normal pain that she has been experiencing. Reports her appointment with ob/gyn is on December 5, 2023. Does report she has had some abnormal vaginal bleeding over the last few months. She reports taking Tylenol and ibuprofen without any relief. She denies any fever, chills, nausea, vomiting, diarrhea, constipation, dysuria, hematuria, urinary frequency, urinary urgency, current vaginal bleeding, change in vaginal discharge, genital rash, genital lesion. Denies known exposure to STDs. Nursing Notes were all reviewed and agreed with or any disagreements were addressed in the HPI.

## 2023-12-07 DIAGNOSIS — E55.9 VITAMIN D DEFICIENCY, UNSPECIFIED: ICD-10-CM

## 2023-12-07 RX ORDER — ERGOCALCIFEROL 1.25 MG/1
50000 CAPSULE ORAL WEEKLY
Qty: 12 CAPSULE | Refills: 3 | Status: SHIPPED | OUTPATIENT
Start: 2023-12-07

## 2023-12-07 NOTE — TELEPHONE ENCOUNTER
Last appointment: 6/15/23  Next appointment: Max Mcneil to follow-up 6/15/24  Previous refill encounter(s): 6/15/23 #12 with 1 refill    Requested Prescriptions     Pending Prescriptions Disp Refills    vitamin D (ERGOCALCIFEROL) 1.25 MG (25790 UT) CAPS capsule [Pharmacy Med Name: VITAMIN D2 1.25MG(50,000 UNIT)] 12 capsule 3     Sig: TAKE 1 CAPSULE BY MOUTH ONE TIME PER WEEK         For Pharmacy Admin Tracking Only    Program: Medication Refill  CPA in place:    Recommendation Provided To:    Intervention Detail: New Rx: 1, reason: Patient Preference  Intervention Accepted By:   Keke Chong Closed?:    Time Spent (min): 5

## 2024-01-22 DIAGNOSIS — R60.9 EDEMA, UNSPECIFIED: ICD-10-CM

## 2024-01-22 DIAGNOSIS — E66.01 SEVERE OBESITY (HCC): Primary | ICD-10-CM

## 2024-01-22 DIAGNOSIS — G44.201 ACUTE INTRACTABLE TENSION-TYPE HEADACHE: ICD-10-CM

## 2024-01-22 DIAGNOSIS — K59.04 CHRONIC IDIOPATHIC CONSTIPATION: ICD-10-CM

## 2024-01-22 RX ORDER — PHENTERMINE HYDROCHLORIDE 37.5 MG/1
37.5 TABLET ORAL DAILY
Qty: 30 TABLET | Refills: 0 | Status: SHIPPED | OUTPATIENT
Start: 2024-01-22 | End: 2024-02-21

## 2024-01-22 NOTE — TELEPHONE ENCOUNTER
phentermine (ADIPEX-P) 37.5 MG tablet     linaclotide (LINZESS) 290 MCG CAPS capsule  send to [facility] #[id number]  Patient can be reached at [phone].  Patient wants to know if she can be squeezed in so that she could get her refills.

## 2024-01-23 RX ORDER — BUTALBITAL, ACETAMINOPHEN AND CAFFEINE 50; 325; 40 MG/1; MG/1; MG/1
1 TABLET ORAL EVERY 6 HOURS PRN
Qty: 60 TABLET | Refills: 0 | Status: SHIPPED | OUTPATIENT
Start: 2024-01-23

## 2024-01-23 RX ORDER — BUMETANIDE 1 MG/1
TABLET ORAL
Qty: 90 TABLET | Refills: 2 | Status: SHIPPED | OUTPATIENT
Start: 2024-01-23

## 2024-02-15 ENCOUNTER — TELEPHONE (OUTPATIENT)
Age: 39
End: 2024-02-15

## 2024-02-15 NOTE — TELEPHONE ENCOUNTER
Patient contacted to schedule an office visit with another provider. No answer unable to leave a message.

## 2024-02-15 NOTE — TELEPHONE ENCOUNTER
Patient wants a return call regarding having possible pink eye.   Please give her a call @ [phone]

## 2024-02-15 NOTE — TELEPHONE ENCOUNTER
Patient contacted facility  attempted to schedule appointment with MD Starr but patient stated she is currently at work. [facility] and will follow up with provider at a later date.

## 2024-02-19 ENCOUNTER — TELEPHONE (OUTPATIENT)
Age: 39
End: 2024-02-19

## 2024-02-19 NOTE — TELEPHONE ENCOUNTER
Patient wants a return call regarding having possible pink eye.   Please give her a call @ [phone]

## 2024-02-19 NOTE — TELEPHONE ENCOUNTER
Schedule her for 330p tomorrow 2/20/24 (Tuesday) just for pink eye.  If she is not able to make this appt, I recommend urgent care.

## 2024-02-20 ENCOUNTER — OFFICE VISIT (OUTPATIENT)
Age: 39
End: 2024-02-20
Payer: MEDICAID

## 2024-02-20 VITALS
SYSTOLIC BLOOD PRESSURE: 115 MMHG | HEART RATE: 61 BPM | DIASTOLIC BLOOD PRESSURE: 79 MMHG | TEMPERATURE: 98 F | OXYGEN SATURATION: 100 % | RESPIRATION RATE: 16 BRPM | WEIGHT: 226.8 LBS | BODY MASS INDEX: 35.52 KG/M2

## 2024-02-20 DIAGNOSIS — H10.9 CONJUNCTIVITIS OF LEFT EYE, UNSPECIFIED CONJUNCTIVITIS TYPE: Primary | ICD-10-CM

## 2024-02-20 PROBLEM — D50.9 IRON DEFICIENCY ANEMIA: Status: RESOLVED | Noted: 2019-09-27 | Resolved: 2024-02-20

## 2024-02-20 PROBLEM — D25.9 UTERINE LEIOMYOMA: Status: ACTIVE | Noted: 2023-12-05

## 2024-02-20 PROBLEM — N92.0 MENORRHAGIA: Status: ACTIVE | Noted: 2023-11-08

## 2024-02-20 PROCEDURE — 99213 OFFICE O/P EST LOW 20 MIN: CPT | Performed by: NURSE PRACTITIONER

## 2024-02-20 RX ORDER — TOBRAMYCIN 3 MG/ML
1 SOLUTION/ DROPS OPHTHALMIC EVERY 6 HOURS
Qty: 5 ML | Refills: 0 | Status: SHIPPED | OUTPATIENT
Start: 2024-02-20 | End: 2024-02-22 | Stop reason: SDUPTHER

## 2024-02-20 ASSESSMENT — PATIENT HEALTH QUESTIONNAIRE - PHQ9
2. FEELING DOWN, DEPRESSED OR HOPELESS: 0
SUM OF ALL RESPONSES TO PHQ9 QUESTIONS 1 & 2: 0
SUM OF ALL RESPONSES TO PHQ QUESTIONS 1-9: 0
1. LITTLE INTEREST OR PLEASURE IN DOING THINGS: 0
SUM OF ALL RESPONSES TO PHQ QUESTIONS 1-9: 0

## 2024-02-20 ASSESSMENT — VISUAL ACUITY: OU: 1

## 2024-02-20 ASSESSMENT — ENCOUNTER SYMPTOMS
EYE REDNESS: 1
PHOTOPHOBIA: 0
EYE PAIN: 1
RESPIRATORY NEGATIVE: 1
EYE ITCHING: 0
EYE DISCHARGE: 1

## 2024-02-20 NOTE — PROGRESS NOTES
Chief Complaint   Patient presents with    Conjunctivitis       1. Have you been to the ER, urgent care clinic since your last visit?  Hospitalized since your last visit?Yes When: December Where: [facility]  Reason for visit: Diverticulitis     2. Have you seen or consulted any other health care providers outside of the [facility] since your last visit?  Include any pap smears or colon screening. No     The patient, Ewa Olivas, identity was verified by name and .  
file   Tobacco Use    Smoking status: Never    Smokeless tobacco: Never   Vaping Use    Vaping Use: Never used   Substance and Sexual Activity    Alcohol use: Yes    Drug use: No    Sexual activity: Not on file   Other Topics Concern    Not on file   Social History Narrative    3 children, 17y, 15y, 13y.   Working for [employer] and part time in evening at [employer].     Social Determinants of Health     Financial Resource Strain: Medium Risk (6/15/2023)    Overall Financial Resource Strain (CARDIA)     Difficulty of Paying Living Expenses: Somewhat hard   Food Insecurity: Not on file (6/15/2023)   Transportation Needs: Unknown (6/15/2023)    PRAPARE - Transportation     Lack of Transportation (Medical): Not on file     Lack of Transportation (Non-Medical): No   Physical Activity: Not on file   Stress: Not on file   Social Connections: Not on file   Intimate Partner Violence: Not on file   Housing Stability: Unknown (6/15/2023)    Housing Stability Vital Sign     Unable to Pay for Housing in the Last Year: Not on file     Number of Places Lived in the Last Year: Not on file     Unstable Housing in the Last Year: No       Family History   Problem Relation Age of Onset    No Known Problems Sister     No Known Problems Father     Breast Cancer Cousin     No Known Problems Mother     No Known Problems Brother        Current Outpatient Medications   Medication Sig    tobramycin (TOBREX) 0.3 % ophthalmic solution Place 1 drop into the left eye every 6 hours for 5 days    butalbital-acetaminophen-caffeine (FIORICET, ESGIC) -40 MG per tablet TAKE 1 TABLET BY MOUTH EVERY 6 HOURS AS NEEDED FOR HEADACHES NO MORE THAN 15 TABS PER WEEK    bumetanide (BUMEX) 1 MG tablet TAKE 1 TABLET BY MOUTH EVERY DAY AS NEEDED FOR SWELLING    linaclotide (LINZESS) 290 MCG CAPS capsule Take 1 capsule by mouth every morning (before breakfast)    phentermine (ADIPEX-P) 37.5 MG tablet Take 1 tablet by mouth daily for 30 days. Max

## 2024-02-22 ENCOUNTER — TELEPHONE (OUTPATIENT)
Age: 39
End: 2024-02-22

## 2024-02-22 DIAGNOSIS — H10.9 CONJUNCTIVITIS OF LEFT EYE, UNSPECIFIED CONJUNCTIVITIS TYPE: ICD-10-CM

## 2024-02-22 RX ORDER — TOBRAMYCIN 3 MG/ML
1 SOLUTION/ DROPS OPHTHALMIC EVERY 6 HOURS
Qty: 5 ML | Refills: 0 | Status: SHIPPED | OUTPATIENT
Start: 2024-02-22 | End: 2024-02-27

## 2024-02-22 NOTE — TELEPHONE ENCOUNTER
Patient requesting eye Tobrex ophthalmic drops  prescription be sent to [facility] in [place] for right eye also. Patient can be reached at [phone].

## 2024-02-22 NOTE — TELEPHONE ENCOUNTER
Orders Placed This Encounter    tobramycin (TOBREX) 0.3 % ophthalmic solution     Sig: Place 1 drop into both eyes every 6 hours for 5 days     Dispense:  5 mL     Refill:  0

## 2024-03-08 ENCOUNTER — TELEPHONE (OUTPATIENT)
Age: 39
End: 2024-03-08

## 2024-03-14 DIAGNOSIS — E66.01 SEVERE OBESITY (HCC): ICD-10-CM

## 2024-03-15 RX ORDER — PHENTERMINE HYDROCHLORIDE 37.5 MG/1
37.5 TABLET ORAL DAILY
Qty: 30 TABLET | Refills: 0 | OUTPATIENT
Start: 2024-03-15 | End: 2024-04-14

## 2024-03-15 NOTE — TELEPHONE ENCOUNTER
Last appointment: 2/20/24  Next appointment: 7/30/24  Previous refill encounter(s): 1/22/24 #30    Requested Prescriptions     Pending Prescriptions Disp Refills    phentermine (ADIPEX-P) 37.5 MG tablet [Pharmacy Med Name: PHENTERMINE 37.5 MG TABLET] 30 tablet 0     Sig: Take 1 tablet by mouth daily for 30 days. Max Daily Amount: 37.5 mg         For Pharmacy Admin Tracking Only    Program: Medication Refill  CPA in place:    Recommendation Provided To:   Intervention Detail: New Rx: 1, reason: Patient Preference  Intervention Accepted By:   Gap Closed?:    Time Spent (min): 5

## 2024-03-19 DIAGNOSIS — D50.0 IRON DEFICIENCY ANEMIA DUE TO CHRONIC BLOOD LOSS: ICD-10-CM

## 2024-03-19 RX ORDER — FERROUS GLUCONATE 324(38)MG
324 TABLET ORAL DAILY
Qty: 90 TABLET | Refills: 3 | Status: SHIPPED | OUTPATIENT
Start: 2024-03-19

## 2024-03-25 DIAGNOSIS — E66.01 SEVERE OBESITY (HCC): ICD-10-CM

## 2024-03-26 RX ORDER — PHENTERMINE HYDROCHLORIDE 37.5 MG/1
37.5 TABLET ORAL DAILY
Qty: 30 TABLET | Refills: 0 | OUTPATIENT
Start: 2024-03-26 | End: 2024-04-25

## 2024-03-27 NOTE — TELEPHONE ENCOUNTER
Patient is requesting a RX refill  phentermine (ADIPEX-P) 37.5 MG tablet she can be reached @ [phone]

## 2024-03-28 RX ORDER — PHENTERMINE HYDROCHLORIDE 37.5 MG/1
37.5 TABLET ORAL
Qty: 30 TABLET | Refills: 1 | Status: SHIPPED | OUTPATIENT
Start: 2024-03-28 | End: 2024-05-27

## 2024-04-12 DIAGNOSIS — D50.0 IRON DEFICIENCY ANEMIA DUE TO CHRONIC BLOOD LOSS: ICD-10-CM

## 2024-04-12 RX ORDER — ASCORBIC ACID 500 MG
TABLET ORAL
Qty: 30 TABLET | Refills: 3 | OUTPATIENT
Start: 2024-04-12

## 2024-07-18 DIAGNOSIS — D50.0 IRON DEFICIENCY ANEMIA DUE TO CHRONIC BLOOD LOSS: ICD-10-CM

## 2024-07-18 RX ORDER — ASCORBIC ACID 500 MG
TABLET ORAL
Qty: 30 TABLET | Refills: 3 | OUTPATIENT
Start: 2024-07-18

## 2024-07-30 ENCOUNTER — OFFICE VISIT (OUTPATIENT)
Age: 39
End: 2024-07-30
Payer: MEDICAID

## 2024-07-30 VITALS
SYSTOLIC BLOOD PRESSURE: 120 MMHG | BODY MASS INDEX: 35.87 KG/M2 | DIASTOLIC BLOOD PRESSURE: 78 MMHG | RESPIRATION RATE: 18 BRPM | WEIGHT: 229 LBS | TEMPERATURE: 97.5 F | HEART RATE: 74 BPM | OXYGEN SATURATION: 99 %

## 2024-07-30 DIAGNOSIS — Z12.31 BREAST CANCER SCREENING BY MAMMOGRAM: ICD-10-CM

## 2024-07-30 DIAGNOSIS — Z13.220 SCREENING FOR LIPID DISORDERS: ICD-10-CM

## 2024-07-30 DIAGNOSIS — E66.01 SEVERE OBESITY (HCC): ICD-10-CM

## 2024-07-30 DIAGNOSIS — Z00.00 ROUTINE GENERAL MEDICAL EXAMINATION AT A HEALTH CARE FACILITY: Primary | ICD-10-CM

## 2024-07-30 DIAGNOSIS — E55.9 VITAMIN D DEFICIENCY, UNSPECIFIED: ICD-10-CM

## 2024-07-30 DIAGNOSIS — D50.9 IRON DEFICIENCY ANEMIA, UNSPECIFIED IRON DEFICIENCY ANEMIA TYPE: ICD-10-CM

## 2024-07-30 PROCEDURE — 99396 PREV VISIT EST AGE 40-64: CPT | Performed by: NURSE PRACTITIONER

## 2024-07-30 RX ORDER — PHENTERMINE HYDROCHLORIDE 37.5 MG/1
37.5 TABLET ORAL DAILY
Qty: 30 TABLET | Refills: 1 | Status: SHIPPED | OUTPATIENT
Start: 2024-07-30 | End: 2024-10-28

## 2024-07-30 SDOH — ECONOMIC STABILITY: HOUSING INSECURITY
IN THE LAST 12 MONTHS, WAS THERE A TIME WHEN YOU DID NOT HAVE A STEADY PLACE TO SLEEP OR SLEPT IN A SHELTER (INCLUDING NOW)?: NO

## 2024-07-30 SDOH — ECONOMIC STABILITY: INCOME INSECURITY: HOW HARD IS IT FOR YOU TO PAY FOR THE VERY BASICS LIKE FOOD, HOUSING, MEDICAL CARE, AND HEATING?: NOT HARD AT ALL

## 2024-07-30 SDOH — ECONOMIC STABILITY: FOOD INSECURITY: WITHIN THE PAST 12 MONTHS, THE FOOD YOU BOUGHT JUST DIDN'T LAST AND YOU DIDN'T HAVE MONEY TO GET MORE.: NEVER TRUE

## 2024-07-30 SDOH — ECONOMIC STABILITY: FOOD INSECURITY: WITHIN THE PAST 12 MONTHS, YOU WORRIED THAT YOUR FOOD WOULD RUN OUT BEFORE YOU GOT MONEY TO BUY MORE.: NEVER TRUE

## 2024-07-30 ASSESSMENT — ENCOUNTER SYMPTOMS
SHORTNESS OF BREATH: 0
COUGH: 0
ABDOMINAL PAIN: 0
VOMITING: 0
DIARRHEA: 0
CONSTIPATION: 0
NAUSEA: 0
BLOOD IN STOOL: 0

## 2024-07-30 ASSESSMENT — PATIENT HEALTH QUESTIONNAIRE - PHQ9
2. FEELING DOWN, DEPRESSED OR HOPELESS: NOT AT ALL
SUM OF ALL RESPONSES TO PHQ QUESTIONS 1-9: 0
1. LITTLE INTEREST OR PLEASURE IN DOING THINGS: NOT AT ALL
SUM OF ALL RESPONSES TO PHQ9 QUESTIONS 1 & 2: 0
SUM OF ALL RESPONSES TO PHQ QUESTIONS 1-9: 0

## 2024-07-30 NOTE — PROGRESS NOTES
Chief Complaint   Patient presents with    5 month follow up       1. \"Have you been to the ER, urgent care clinic since your last visit?  Hospitalized since your last visit?\" No    2. \"Have you seen or consulted any other health care providers outside of the [facility] since your last visit?\" No     3. For patients aged 45-75: Has the patient had a colonoscopy / FIT/ Cologuard? N/A      If the patient is female:    4. For patients aged 40-74: Has the patient had a mammogram within the past 2 years? No      5. For patients aged 21-65: Has the patient had a pap smear? Yes        The patient, Ewa Olivas, identity was verified by name and .    Health Maintenance Due   Topic Date Due    Hepatitis B vaccine (1 of 3 - 3-dose series) Never done    Varicella vaccine (1 of 2 - 2-dose childhood series) Never done    Cervical cancer screen  2017    Breast cancer screen  2023

## 2024-07-30 NOTE — PROGRESS NOTES
Ewa Caro (:  1985) is a 39 y.o. female,Established patient, here for evaluation of the following chief complaint(s):  5 month follow up      Assessment & Plan   ASSESSMENT/PLAN:  1. Routine general medical examination at a health care facility  2. Severe obesity (HCC) - she has lost 25 pounds in last year.  Takes phentermine prn - usually during menses which is when appetite increases.  Continue with healthy diet,encouraged pant based diet,, regular physical activity - aim for 150 minutes weekly.  -     phentermine (ADIPEX-P) 37.5 MG tablet; Take 1 tablet by mouth daily for 90 days. Max Daily Amount: 37.5 mg, Disp-30 tablet, R-1Normal  -     CBC with Auto Differential; Future  -     Comprehensive Metabolic Panel; Future  -     Hemoglobin A1C; Future  -     TSH; Future  -     Urinalysis; Future  3. Iron deficiency anemia, unspecified iron deficiency anemia type - taking iron supplement once daily.  Will check labs.  -     CBC with Auto Differential; Future  -     Iron and TIBC; Future  -     Ferritin; Future  4. Vitamin D deficiency, unspecified  -     Comprehensive Metabolic Panel; Future  -     Vitamin D 25 Hydroxy; Future  5. Breast cancer screening by mammogram  -     [facility] MARY DIGITAL SCREEN BILATERAL; Future  6. Screening for lipid disorders  -     Lipid Panel; Future      Return in about 6 months (around 2025).         Subjective   SUBJECTIVE/OBJECTIVE:  HPI  patient comes in today for follow up/CPE      2024     4:08 PM 2024     3:44 PM 2023     8:32 PM 2023     6:15 PM 2023     1:57 PM 2023     8:30 AM 6/15/2023    10:41 AM   Weight Metrics   Weight 229 lb 226 lb 12.8 oz 230 lb 232 lb 5.8 oz 235 lb 229 lb 9.6 oz 239 lb 3.2 oz   BMI (Calculated) 0 kg/m2 0 kg/m2 36.1 kg/m2 0 kg/m2 38 kg/m2 0 kg/m2 38.7 kg/m2     Weight down about 25 pounds in last year  Menses - tends to gain weight during cycle, appetite increases.  Will use phentermine prn during this time.  Wants

## 2024-07-30 NOTE — PATIENT INSTRUCTIONS
Dr. Ellen Jaquez  [facility]  [address] [place] [address]    Lori Kitchen NP, Ann Mcelroy NP  [facility]  [address], [address]  [place] [address] [phone]  OR  [facility]  [address] [place] [address] [phone]    Keon Mckeon NP, Giulia Ibarra [facility]  [address] [place] [address] [phone]    Dr. Mark Ponce, Dr. Lindsay Arredondo, Dr. Bailey [facility]  [address], [address]  [place] [address] [phone]    Dr. Vince Ibarra [facility]  [address]  [place] [address] [phone]    Laura Lopez NP  [facility]  [address], [address]  [place] [address] [phone]

## 2024-08-01 ENCOUNTER — OFFICE VISIT (OUTPATIENT)
Age: 39
End: 2024-08-01

## 2024-08-01 DIAGNOSIS — E66.01 SEVERE OBESITY (HCC): ICD-10-CM

## 2024-08-01 DIAGNOSIS — E55.9 VITAMIN D DEFICIENCY, UNSPECIFIED: ICD-10-CM

## 2024-08-01 DIAGNOSIS — Z13.220 SCREENING FOR LIPID DISORDERS: ICD-10-CM

## 2024-08-01 DIAGNOSIS — D50.9 IRON DEFICIENCY ANEMIA, UNSPECIFIED IRON DEFICIENCY ANEMIA TYPE: ICD-10-CM

## 2024-08-01 LAB
APPEARANCE UR: CLEAR
BILIRUB UR QL: NEGATIVE
COLOR UR: NORMAL
GLUCOSE UR STRIP.AUTO-MCNC: NEGATIVE MG/DL
HGB UR QL STRIP: NEGATIVE
KETONES UR QL STRIP.AUTO: NEGATIVE MG/DL
LEUKOCYTE ESTERASE UR QL STRIP.AUTO: NEGATIVE
NITRITE UR QL STRIP.AUTO: NEGATIVE
PH UR STRIP: 6 (ref 5–8)
PROT UR STRIP-MCNC: NEGATIVE MG/DL
SP GR UR REFRACTOMETRY: 1.01 (ref 1–1.03)
SPECIMEN HOLD: NORMAL
UROBILINOGEN UR QL STRIP.AUTO: 0.2 EU/DL (ref 0.2–1)

## 2024-08-01 NOTE — PROGRESS NOTES
Chief Complaint   Patient presents with    Labs Only       The patient, Ewa Olivas, identity was verified by name and .

## 2024-08-02 LAB
25(OH)D3 SERPL-MCNC: 52.8 NG/ML (ref 30–100)
ALBUMIN SERPL-MCNC: 3.8 G/DL (ref 3.5–5)
ALBUMIN/GLOB SERPL: 1.1 (ref 1.1–2.2)
ALP SERPL-CCNC: 62 U/L (ref 45–117)
ALT SERPL-CCNC: 16 U/L (ref 12–78)
ANION GAP SERPL CALC-SCNC: 5 MMOL/L (ref 5–15)
AST SERPL-CCNC: 14 U/L (ref 15–37)
BASOPHILS # BLD: 0 K/UL (ref 0–0.1)
BASOPHILS NFR BLD: 1 % (ref 0–1)
BILIRUB SERPL-MCNC: 0.6 MG/DL (ref 0.2–1)
BUN SERPL-MCNC: 11 MG/DL (ref 6–20)
BUN/CREAT SERPL: 12 (ref 12–20)
CALCIUM SERPL-MCNC: 10 MG/DL (ref 8.5–10.1)
CHLORIDE SERPL-SCNC: 104 MMOL/L (ref 97–108)
CHOLEST SERPL-MCNC: 234 MG/DL
CO2 SERPL-SCNC: 29 MMOL/L (ref 21–32)
CREAT SERPL-MCNC: 0.95 MG/DL (ref 0.55–1.02)
DIFFERENTIAL METHOD BLD: ABNORMAL
EOSINOPHIL # BLD: 0.1 K/UL (ref 0–0.4)
EOSINOPHIL NFR BLD: 2 % (ref 0–7)
ERYTHROCYTE [DISTWIDTH] IN BLOOD BY AUTOMATED COUNT: 14.7 % (ref 11.5–14.5)
EST. AVERAGE GLUCOSE BLD GHB EST-MCNC: 100 MG/DL
FERRITIN SERPL-MCNC: 6 NG/ML (ref 8–252)
GLOBULIN SER CALC-MCNC: 3.5 G/DL (ref 2–4)
GLUCOSE SERPL-MCNC: 92 MG/DL (ref 65–100)
HBA1C MFR BLD: 5.1 % (ref 4–5.6)
HCT VFR BLD AUTO: 36.1 % (ref 35–47)
HDLC SERPL-MCNC: 53 MG/DL
HDLC SERPL: 4.4 (ref 0–5)
HGB BLD-MCNC: 12.3 G/DL (ref 11.5–16)
IMM GRANULOCYTES # BLD AUTO: 0 K/UL (ref 0–0.04)
IMM GRANULOCYTES NFR BLD AUTO: 0 % (ref 0–0.5)
IRON SATN MFR SERPL: 11 % (ref 20–50)
IRON SERPL-MCNC: 49 UG/DL (ref 35–150)
LDLC SERPL CALC-MCNC: 155.6 MG/DL (ref 0–100)
LYMPHOCYTES # BLD: 1 K/UL (ref 0.8–3.5)
LYMPHOCYTES NFR BLD: 29 % (ref 12–49)
MCH RBC QN AUTO: 27.5 PG (ref 26–34)
MCHC RBC AUTO-ENTMCNC: 34.1 G/DL (ref 30–36.5)
MCV RBC AUTO: 80.8 FL (ref 80–99)
MONOCYTES # BLD: 0.2 K/UL (ref 0–1)
MONOCYTES NFR BLD: 7 % (ref 5–13)
NEUTS SEG # BLD: 2 K/UL (ref 1.8–8)
NEUTS SEG NFR BLD: 61 % (ref 32–75)
NRBC # BLD: 0 K/UL (ref 0–0.01)
NRBC BLD-RTO: 0 PER 100 WBC
PLATELET # BLD AUTO: 301 K/UL (ref 150–400)
PMV BLD AUTO: 13 FL (ref 8.9–12.9)
POTASSIUM SERPL-SCNC: 4.3 MMOL/L (ref 3.5–5.1)
PROT SERPL-MCNC: 7.3 G/DL (ref 6.4–8.2)
RBC # BLD AUTO: 4.47 M/UL (ref 3.8–5.2)
SODIUM SERPL-SCNC: 138 MMOL/L (ref 136–145)
TIBC SERPL-MCNC: 455 UG/DL (ref 250–450)
TRIGL SERPL-MCNC: 127 MG/DL
TSH SERPL DL<=0.05 MIU/L-ACNC: 1.67 UIU/ML (ref 0.36–3.74)
VLDLC SERPL CALC-MCNC: 25.4 MG/DL
WBC # BLD AUTO: 3.4 K/UL (ref 3.6–11)

## 2024-08-12 DIAGNOSIS — R60.9 EDEMA, UNSPECIFIED: ICD-10-CM

## 2024-08-12 DIAGNOSIS — D50.0 IRON DEFICIENCY ANEMIA DUE TO CHRONIC BLOOD LOSS: ICD-10-CM

## 2024-08-12 DIAGNOSIS — G44.201 ACUTE INTRACTABLE TENSION-TYPE HEADACHE: ICD-10-CM

## 2024-08-12 DIAGNOSIS — E55.9 VITAMIN D DEFICIENCY, UNSPECIFIED: ICD-10-CM

## 2024-08-12 RX ORDER — ERGOCALCIFEROL 1.25 MG/1
50000 CAPSULE ORAL WEEKLY
Qty: 12 CAPSULE | Refills: 3 | Status: SHIPPED | OUTPATIENT
Start: 2024-08-12

## 2024-08-12 RX ORDER — FERROUS GLUCONATE 324(38)MG
324 TABLET ORAL
Qty: 90 TABLET | Refills: 3 | Status: SHIPPED | OUTPATIENT
Start: 2024-08-12

## 2024-08-12 NOTE — TELEPHONE ENCOUNTER
Patient contacted and discussed lab results per NP recommendations. Patient voiced understanding. Requesting  refill on medications as well.

## 2024-09-09 ENCOUNTER — TELEPHONE (OUTPATIENT)
Age: 39
End: 2024-09-09

## 2024-09-09 DIAGNOSIS — N64.4 BREAST PAIN, RIGHT: Primary | ICD-10-CM

## 2024-09-09 DIAGNOSIS — Z12.31 BREAST CANCER SCREENING BY MAMMOGRAM: ICD-10-CM

## 2024-10-16 NOTE — PROGRESS NOTES
[facility] [place]  Office Note  Please note that this dictation was completed with [employer], the computer voice recognition software.  Quite often unanticipated grammatical, syntax, homophones, and other interpretive errors are inadvertently transcribed by the computer software.  Please disregard these errors.  Please excuse any errors that have escaped final proofreading.       History of present illness:Ewa Caro is a 39 y.o. female presenting for Follow-up (Vitamin d deficiency)      Previous primary care with Tamica Perez nurse practitioner at [facility] in [place].  Last seen in August 2024 with labs, CPE July 2024. Past medical history obesity, iron deficiency anemia, vitamin D deficiency, hyperlipidemia    Obesity  Prescribing phentermine, Taking sporadically over the past year and a half  Mindful eating, gym   Last fill on August 30    Iron deficiency anemia  Ferrous gluconate inconsistent  History of menorrhagia  [facility]    Vitamin D deficiency  Weekly supplement  Normal checked in August    History of constipation  Linzess as needed, averages taking once a week    Bumex for edema  Takes around menstrual cycle, gains 15 lbs water weight    Headaches/migraines  Stress trigger  Stable, no concerns    Review of Systems   Constitutional: Negative.    Respiratory:  Negative for shortness of breath.    Cardiovascular:  Negative for chest pain and leg swelling.   Neurological:  Negative for dizziness.         Past Medical History:   Diagnosis Date    Bipolar disorder (HCC) 11/4/2013    Depression 3/29/2012    Endometriosis 11/20/2012    Hyperlipidemia 6/15/2023    Iron deficiency anemia 9/27/2019    Migraine without aura and without status migrainosus, not intractable 6/15/2023    Obesity 2/23/2015    Severe obesity 9/27/2019    Sickle cell trait (HCC)     Vitamin D deficiency, unspecified 6/15/2023       No Known Allergies     Prior to

## 2024-10-18 ENCOUNTER — OFFICE VISIT (OUTPATIENT)
Facility: CLINIC | Age: 39
End: 2024-10-18
Payer: MEDICAID

## 2024-10-18 VITALS
WEIGHT: 233.5 LBS | DIASTOLIC BLOOD PRESSURE: 76 MMHG | RESPIRATION RATE: 16 BRPM | SYSTOLIC BLOOD PRESSURE: 126 MMHG | HEIGHT: 67 IN | BODY MASS INDEX: 36.65 KG/M2 | TEMPERATURE: 98 F | HEART RATE: 60 BPM | OXYGEN SATURATION: 96 %

## 2024-10-18 DIAGNOSIS — E66.812 CLASS 2 OBESITY WITH BODY MASS INDEX (BMI) OF 36.0 TO 36.9 IN ADULT, UNSPECIFIED OBESITY TYPE, UNSPECIFIED WHETHER SERIOUS COMORBIDITY PRESENT: ICD-10-CM

## 2024-10-18 DIAGNOSIS — E55.9 VITAMIN D DEFICIENCY, UNSPECIFIED: Primary | ICD-10-CM

## 2024-10-18 DIAGNOSIS — D50.0 IRON DEFICIENCY ANEMIA DUE TO CHRONIC BLOOD LOSS: ICD-10-CM

## 2024-10-18 DIAGNOSIS — E78.00 PURE HYPERCHOLESTEROLEMIA: ICD-10-CM

## 2024-10-18 PROCEDURE — 99214 OFFICE O/P EST MOD 30 MIN: CPT | Performed by: NURSE PRACTITIONER

## 2024-10-18 ASSESSMENT — ENCOUNTER SYMPTOMS: SHORTNESS OF BREATH: 0

## 2024-10-18 NOTE — PROGRESS NOTES
Ewa Caro is a 39 y.o. female     Chief Complaint   Patient presents with    Follow-up     Vitamin d deficiency       /76 (Site: Left Upper Arm, Position: Sitting, Cuff Size: Medium Adult)   Pulse 60   Temp 98 °F (36.7 °C) (Oral)   Resp 16   Ht 1.702 m (5' 7\")   Wt 105.9 kg (233 lb 8 oz)   SpO2 96%   BMI 36.57 kg/m²     Health Maintenance Due   Topic Date Due    Varicella vaccine (1 of 2 - 13+ 2-dose series) Never done    Hepatitis B vaccine (1 of 3 - 19+ 3-dose series) Never done    Cervical cancer screen  11/20/2017    Breast cancer screen  01/06/2023    Flu vaccine (1) Never done    COVID-19 Vaccine (1 - 2023-24 season) Never done         \"Have you been to the ER, urgent care clinic since your last visit?  Hospitalized since your last visit?\"    NO    “Have you seen or consulted any other health care providers outside of [facility] since your last visit?”    NO       Have you had a mammogram?”   NO    Date of last Mammogram: 11/19/2018      “Have you had a pap smear?”    NO    Date of last Cervical Cancer screen (HPV or PAP): 11/20/2012

## 2024-10-23 ENCOUNTER — HOSPITAL ENCOUNTER (OUTPATIENT)
Facility: HOSPITAL | Age: 39
Discharge: HOME | End: 2024-10-26
Payer: MEDICAID

## 2024-10-23 DIAGNOSIS — Z12.31 BREAST CANCER SCREENING BY MAMMOGRAM: ICD-10-CM

## 2024-10-23 DIAGNOSIS — N64.4 BREAST PAIN, RIGHT: ICD-10-CM

## 2024-10-23 PROCEDURE — G0279 TOMOSYNTHESIS, MAMMO: HCPCS

## 2024-12-02 DIAGNOSIS — E66.01 SEVERE OBESITY: Primary | ICD-10-CM

## 2024-12-02 RX ORDER — PHENTERMINE HYDROCHLORIDE 37.5 MG/1
37.5 TABLET ORAL
Qty: 30 TABLET | Refills: 0 | OUTPATIENT
Start: 2024-12-02 | End: 2025-01-01

## 2024-12-02 NOTE — TELEPHONE ENCOUNTER
Last appointment: 10/18/24  Next appointment: 1/20/25  Previous refill encounter(s): 7/30/24 #30 with 1 refill    Requested Prescriptions     Pending Prescriptions Disp Refills    phentermine (ADIPEX-P) 37.5 MG tablet 30 tablet 0     Sig: Take 1 tablet by mouth every morning (before breakfast) for 30 days. Max Daily Amount: 37.5 mg         For Pharmacy Admin Tracking Only    Program: Medication Refill  CPA in place:    Recommendation Provided To:   Intervention Detail: New Rx: 1, reason: Patient Preference  Intervention Accepted By:   Gap Closed?:    Time Spent (min): 5

## 2025-01-06 DIAGNOSIS — D50.0 IRON DEFICIENCY ANEMIA DUE TO CHRONIC BLOOD LOSS: ICD-10-CM

## 2025-01-07 RX ORDER — ASCORBIC ACID 500 MG
TABLET ORAL
Qty: 30 TABLET | Refills: 3 | OUTPATIENT
Start: 2025-01-07

## 2025-01-17 NOTE — PROGRESS NOTES
[facility] [place]  Office Note  Please note that this dictation was completed with [employer], the computer voice recognition software.  Quite often unanticipated grammatical, syntax, homophones, and other interpretive errors are inadvertently transcribed by the computer software.  Please disregard these errors.  Please excuse any errors that have escaped final proofreading.       History of present illness:Ewa Caro is a 40 y.o. female presenting for Vitamin D Deficiency      Established patient, second visit with myself. Past medical history obesity, iron deficiency anemia, vitamin D deficiency, hyperlipidemia. CPE due 07/30/2024.     This is a scheduled follow-up for obesity and iron deficiency anemia    Obesity  Weight unchanged since last visit  Likes to use phentermine as needed, prefers to be in the gym  Not interested in GLP-1 agonist    Hyperlipidemia    The 10-year ASCVD risk score (Librado MILLER, et al., 2019) is: 0.8%    Values used to calculate the score:      Age: 40 years      Sex: Female      Is Non- : Yes      Diabetic: No      Tobacco smoker: No      Systolic Blood Pressure: 124 mmHg      Is BP treated: No      HDL Cholesterol: 53 MG/DL      Total Cholesterol: 234 MG/DL     Iron deficiency anemia  Labs checked with GYN recently due to episode of heavy menstrual bleeding  Cycle secondary to fibroid, she has a pending appointment with GYN in March to discuss treatment  States she is compliant taking iron daily  CBC reviewed and unchanged since July 2024    Vitamin D deficiency  Taking vitamin D weekly    Runny nose x 7 days    Review of Systems   Constitutional:  Positive for fatigue.   Respiratory:  Negative for shortness of breath.    Cardiovascular:  Negative for chest pain and leg swelling.   Neurological:  Negative for dizziness.         Past Medical History:   Diagnosis Date    Bipolar disorder (HCC) 11/4/2013

## 2025-01-20 ENCOUNTER — OFFICE VISIT (OUTPATIENT)
Facility: CLINIC | Age: 40
End: 2025-01-20
Payer: MEDICAID

## 2025-01-20 VITALS
OXYGEN SATURATION: 98 % | BODY MASS INDEX: 36.84 KG/M2 | HEART RATE: 68 BPM | HEIGHT: 67 IN | SYSTOLIC BLOOD PRESSURE: 124 MMHG | DIASTOLIC BLOOD PRESSURE: 80 MMHG | RESPIRATION RATE: 16 BRPM | TEMPERATURE: 98.6 F | WEIGHT: 234.7 LBS

## 2025-01-20 DIAGNOSIS — E66.812 CLASS 2 OBESITY WITH BODY MASS INDEX (BMI) OF 36.0 TO 36.9 IN ADULT, UNSPECIFIED OBESITY TYPE, UNSPECIFIED WHETHER SERIOUS COMORBIDITY PRESENT: Primary | ICD-10-CM

## 2025-01-20 DIAGNOSIS — D50.0 IRON DEFICIENCY ANEMIA DUE TO CHRONIC BLOOD LOSS: ICD-10-CM

## 2025-01-20 PROCEDURE — 99213 OFFICE O/P EST LOW 20 MIN: CPT | Performed by: NURSE PRACTITIONER

## 2025-01-20 RX ORDER — PHENTERMINE HYDROCHLORIDE 37.5 MG/1
37.5 CAPSULE ORAL EVERY MORNING
Qty: 30 CAPSULE | Refills: 1 | Status: SHIPPED | OUTPATIENT
Start: 2025-01-20 | End: 2025-03-21

## 2025-01-20 SDOH — ECONOMIC STABILITY: FOOD INSECURITY: WITHIN THE PAST 12 MONTHS, THE FOOD YOU BOUGHT JUST DIDN'T LAST AND YOU DIDN'T HAVE MONEY TO GET MORE.: NEVER TRUE

## 2025-01-20 SDOH — ECONOMIC STABILITY: FOOD INSECURITY: WITHIN THE PAST 12 MONTHS, YOU WORRIED THAT YOUR FOOD WOULD RUN OUT BEFORE YOU GOT MONEY TO BUY MORE.: NEVER TRUE

## 2025-01-20 ASSESSMENT — PATIENT HEALTH QUESTIONNAIRE - PHQ9
1. LITTLE INTEREST OR PLEASURE IN DOING THINGS: NOT AT ALL
2. FEELING DOWN, DEPRESSED OR HOPELESS: NOT AT ALL
SUM OF ALL RESPONSES TO PHQ QUESTIONS 1-9: 0
SUM OF ALL RESPONSES TO PHQ QUESTIONS 1-9: 0
SUM OF ALL RESPONSES TO PHQ9 QUESTIONS 1 & 2: 0
SUM OF ALL RESPONSES TO PHQ QUESTIONS 1-9: 0
SUM OF ALL RESPONSES TO PHQ QUESTIONS 1-9: 0

## 2025-01-20 ASSESSMENT — ENCOUNTER SYMPTOMS: SHORTNESS OF BREATH: 0

## 2025-01-20 NOTE — PROGRESS NOTES
Ewa Caro is a 40 y.o. female     Chief Complaint   Patient presents with    Vitamin D Deficiency       /80 (Site: Left Upper Arm, Position: Sitting, Cuff Size: Medium Adult)   Pulse 68   Temp 98.6 °F (37 °C) (Oral)   Resp 16   Ht 1.702 m (5' 7\")   Wt 106.5 kg (234 lb 11.2 oz)   SpO2 98%   BMI 36.76 kg/m²     Health Maintenance Due   Topic Date Due    Varicella vaccine (1 of 2 - 13+ 2-dose series) Never done    Hepatitis B vaccine (1 of 3 - 19+ 3-dose series) Never done    Cervical cancer screen  11/20/2017    Flu vaccine (1) Never done    COVID-19 Vaccine (1 - 2023-24 season) Never done         \"Have you been to the ER, urgent care clinic since your last visit?  Hospitalized since your last visit?\"    NO    “Have you seen or consulted any other health care providers outside of [facility] since your last visit?”    NO        “Have you had a pap smear?”    YES - Where: [facility] Nurse/CMA to request most recent records if not in the chart    Date of last Cervical Cancer screen (HPV or PAP): 11/20/2012

## 2025-02-03 DIAGNOSIS — R60.9 EDEMA, UNSPECIFIED: ICD-10-CM

## 2025-02-03 RX ORDER — BUMETANIDE 1 MG/1
1 TABLET ORAL DAILY PRN
Qty: 90 TABLET | Refills: 1 | Status: SHIPPED | OUTPATIENT
Start: 2025-02-03

## 2025-02-03 NOTE — TELEPHONE ENCOUNTER
Last appointment: 1/20/25  Next appointment: 8/4/25  Previous refill encounter(s): 1/23/24    Requested Prescriptions     Pending Prescriptions Disp Refills    bumetanide (BUMEX) 1 MG tablet 90 tablet 3     Sig: Take 1 tablet by mouth daily as needed (swelling)         For Pharmacy Admin Tracking Only    Program: Medication Refill  CPA in place:    Recommendation Provided To:   Intervention Detail: New Rx: 1, reason: Patient Preference  Intervention Accepted By:   Gap Closed?:    Time Spent (min): 5

## 2025-02-25 ENCOUNTER — OFFICE VISIT (OUTPATIENT)
Facility: CLINIC | Age: 40
End: 2025-02-25
Payer: MEDICAID

## 2025-02-25 VITALS
DIASTOLIC BLOOD PRESSURE: 78 MMHG | WEIGHT: 230.2 LBS | HEIGHT: 67 IN | SYSTOLIC BLOOD PRESSURE: 118 MMHG | TEMPERATURE: 97.9 F | BODY MASS INDEX: 36.13 KG/M2 | OXYGEN SATURATION: 98 % | RESPIRATION RATE: 16 BRPM | HEART RATE: 62 BPM

## 2025-02-25 DIAGNOSIS — B96.89 ACUTE BACTERIAL SINUSITIS: Primary | ICD-10-CM

## 2025-02-25 DIAGNOSIS — J34.89 INTERNAL NASAL LESION: ICD-10-CM

## 2025-02-25 DIAGNOSIS — J01.90 ACUTE BACTERIAL SINUSITIS: Primary | ICD-10-CM

## 2025-02-25 PROCEDURE — 99213 OFFICE O/P EST LOW 20 MIN: CPT | Performed by: NURSE PRACTITIONER

## 2025-02-25 RX ORDER — MUPIROCIN 20 MG/G
OINTMENT TOPICAL
Qty: 1 G | Refills: 0 | Status: SHIPPED | OUTPATIENT
Start: 2025-02-25 | End: 2025-03-04

## 2025-02-25 RX ORDER — DOXYCYCLINE HYCLATE 100 MG
100 TABLET ORAL 2 TIMES DAILY
Qty: 20 TABLET | Refills: 0 | Status: SHIPPED | OUTPATIENT
Start: 2025-02-25 | End: 2025-03-07

## 2025-02-25 ASSESSMENT — ENCOUNTER SYMPTOMS: SHORTNESS OF BREATH: 0

## 2025-02-25 NOTE — PROGRESS NOTES
Ewa Caro is a 40 y.o. female     Chief Complaint   Patient presents with    Sinus Problem     Burning sensation in sinus, possible cyst that fills with fluid       /78 (Site: Left Upper Arm, Position: Sitting, Cuff Size: Medium Adult)   Pulse 62   Temp 97.9 °F (36.6 °C) (Oral)   Resp 16   Ht 1.702 m (5' 7\")   Wt 104.4 kg (230 lb 3.2 oz)   SpO2 98%   BMI 36.05 kg/m²     Health Maintenance Due   Topic Date Due    Varicella vaccine (1 of 2 - 13+ 2-dose series) Never done    Hepatitis B vaccine (1 of 3 - 19+ 3-dose series) Never done    Cervical cancer screen  11/20/2017    Flu vaccine (1) Never done    COVID-19 Vaccine (1 - 2024-25 season) Never done         \"Have you been to the ER, urgent care clinic since your last visit?  Hospitalized since your last visit?\"    NO    “Have you seen or consulted any other health care providers outside of [facility] since your last visit?”    NO        “Have you had a pap smear?”    NO    Date of last Cervical Cancer screen (HPV or PAP): 11/20/2012

## 2025-02-25 NOTE — PROGRESS NOTES
[facility] [place]  Office Note  Please note that this dictation was completed with [employer], the computer voice recognition software.  Quite often unanticipated grammatical, syntax, homophones, and other interpretive errors are inadvertently transcribed by the computer software.  Please disregard these errors.  Please excuse any errors that have escaped final proofreading.       History of present illness:Ewa Caro is a 40 y.o. female presenting for Sinus Problem (Burning sensation in sinus, possible cyst that fills with fluid)    Established patient, second visit with myself. Past medical history obesity, iron deficiency anemia, vitamin D deficiency, hyperlipidemia.  She is here for an acute sick visit.  She was last seen by myself on January 20 where she reported runny nose for 7 days.  She has continued with runny nose, sneezing, sinus pressure since that time.  She has been using Allegra-D which has helped her sneezing and runny nose.  She reports being exposed to heat and blowing air at the gym/her place of work.  She reports lesion to the inside of her right nares once a month will get bigger and drain.  She denies fever    Review of Systems   Constitutional: Negative.    Respiratory:  Negative for shortness of breath.    Cardiovascular:  Negative for chest pain and leg swelling.   Neurological:  Negative for dizziness.         Past Medical History:   Diagnosis Date    Bipolar disorder (HCC) 11/4/2013    Depression 3/29/2012    Endometriosis 11/20/2012    Hyperlipidemia 6/15/2023    Iron deficiency anemia 9/27/2019    Migraine without aura and without status migrainosus, not intractable 6/15/2023    Obesity 2/23/2015    Severe obesity 9/27/2019    Sickle cell trait     Vitamin D deficiency, unspecified 6/15/2023       No Known Allergies     Prior to Admission medications    Medication Sig Start Date End Date Taking? Authorizing Provider

## 2025-04-25 DIAGNOSIS — E55.9 VITAMIN D DEFICIENCY, UNSPECIFIED: ICD-10-CM

## 2025-04-25 RX ORDER — ASCORBIC ACID 500 MG
500 TABLET ORAL DAILY
COMMUNITY
End: 2025-04-25 | Stop reason: SDUPTHER

## 2025-04-25 NOTE — TELEPHONE ENCOUNTER
Refill request (pt calling)    vitamin D (ERGOCALCIFEROL) 1.25 MG (20082 UT) CAPS capsule     Vitamin C 500mg tablets    [facility] [address]    Pt also wants to know if the rx phentermine (ADIPEX-P) 37.5 MG tablet would cause her to fail a drug test?    Please call to discuss

## 2025-04-28 RX ORDER — ASCORBIC ACID 500 MG
500 TABLET ORAL DAILY
Qty: 30 TABLET | Refills: 3 | Status: SHIPPED | OUTPATIENT
Start: 2025-04-28

## 2025-04-28 RX ORDER — ERGOCALCIFEROL 1.25 MG/1
50000 CAPSULE, LIQUID FILLED ORAL WEEKLY
Qty: 12 CAPSULE | Refills: 0 | Status: SHIPPED | OUTPATIENT
Start: 2025-04-28

## 2025-04-28 NOTE — TELEPHONE ENCOUNTER
I sent refills. Yes, the phentermine will pop positive on a drug screen. Typically, there is a medical review. If you have a prescription, it's normally ok unless there is a specific federal and employer rule.

## 2025-07-07 ENCOUNTER — TELEPHONE (OUTPATIENT)
Facility: CLINIC | Age: 40
End: 2025-07-07

## 2025-07-07 NOTE — TELEPHONE ENCOUNTER
Patient states that she would like phentermine 37.5 MG in tablet form and not capsule form. She states that she never took the ones that were in capsule form. Please send to [facility] in [place] and call with any questions

## 2025-07-07 NOTE — TELEPHONE ENCOUNTER
I refill or prescribe controlled substances only at appointments.  It has been over 6 months since I originally prescribed the phentermine.  She will need an in office appointment. Thanks!

## 2025-07-21 DIAGNOSIS — K59.04 CHRONIC IDIOPATHIC CONSTIPATION: ICD-10-CM

## 2025-07-21 NOTE — TELEPHONE ENCOUNTER
Last appointment: 2/25/25  Next appointment: 8/4/25  Previous refill encounter(s): 1/22/24    Requested Prescriptions     Pending Prescriptions Disp Refills    linaclotide (LINZESS) 290 MCG CAPS capsule 90 capsule 3     Sig: Take 1 capsule by mouth every morning (before breakfast)         For Pharmacy Admin Tracking Only    Program: Medication Refill  CPA in place:    Recommendation Provided To:   Intervention Detail: New Rx: 1, reason: Patient Preference  Intervention Accepted By:   Gap Closed?:    Time Spent (min): 5

## 2025-07-28 RX ORDER — ASCORBIC ACID 500 MG
500 TABLET ORAL DAILY
Qty: 90 TABLET | Refills: 1 | Status: SHIPPED | OUTPATIENT
Start: 2025-07-28

## 2025-07-28 NOTE — TELEPHONE ENCOUNTER
PCP: Hiro Mcleod APRN - NP    Last appt: 2/25/2025    Future Appointments   Date Time Provider Department Center   8/4/2025 10:00 AM Hiro Mcleod APRN - NP [facility]       Requested Prescriptions     Pending Prescriptions Disp Refills    ascorbic acid (TRUE VITAMIN C) 500 MG tablet [Pharmacy Med Name: TRUE VITAMIN C 500 MG TABLET] 90 tablet 1     Sig: TAKE 1 TABLET BY MOUTH EVERY DAY

## 2025-08-04 ENCOUNTER — OFFICE VISIT (OUTPATIENT)
Facility: CLINIC | Age: 40
End: 2025-08-04
Payer: COMMERCIAL

## 2025-08-04 VITALS
WEIGHT: 235 LBS | BODY MASS INDEX: 36.88 KG/M2 | HEIGHT: 67 IN | DIASTOLIC BLOOD PRESSURE: 80 MMHG | TEMPERATURE: 98.5 F | HEART RATE: 72 BPM | RESPIRATION RATE: 16 BRPM | SYSTOLIC BLOOD PRESSURE: 124 MMHG

## 2025-08-04 DIAGNOSIS — E66.812 CLASS 2 SEVERE OBESITY WITH SERIOUS COMORBIDITY AND BODY MASS INDEX (BMI) OF 36.0 TO 36.9 IN ADULT, UNSPECIFIED OBESITY TYPE (HCC): ICD-10-CM

## 2025-08-04 DIAGNOSIS — Z00.00 ANNUAL PHYSICAL EXAM: Primary | ICD-10-CM

## 2025-08-04 DIAGNOSIS — D50.0 IRON DEFICIENCY ANEMIA DUE TO CHRONIC BLOOD LOSS: ICD-10-CM

## 2025-08-04 DIAGNOSIS — E66.01 CLASS 2 SEVERE OBESITY WITH SERIOUS COMORBIDITY AND BODY MASS INDEX (BMI) OF 36.0 TO 36.9 IN ADULT, UNSPECIFIED OBESITY TYPE (HCC): ICD-10-CM

## 2025-08-04 DIAGNOSIS — E78.00 PURE HYPERCHOLESTEROLEMIA: ICD-10-CM

## 2025-08-04 DIAGNOSIS — E55.9 VITAMIN D DEFICIENCY, UNSPECIFIED: ICD-10-CM

## 2025-08-04 DIAGNOSIS — E53.9 VITAMIN B DEFICIENCY: ICD-10-CM

## 2025-08-04 PROCEDURE — 99396 PREV VISIT EST AGE 40-64: CPT | Performed by: NURSE PRACTITIONER

## 2025-08-04 PROCEDURE — 99214 OFFICE O/P EST MOD 30 MIN: CPT | Performed by: NURSE PRACTITIONER

## 2025-08-04 RX ORDER — PHENTERMINE HYDROCHLORIDE 37.5 MG/1
37.5 TABLET ORAL
Qty: 30 TABLET | Refills: 2 | Status: SHIPPED | OUTPATIENT
Start: 2025-08-04 | End: 2025-11-02

## 2025-08-04 ASSESSMENT — ENCOUNTER SYMPTOMS
ABDOMINAL PAIN: 0
SHORTNESS OF BREATH: 0
VOMITING: 0
DIARRHEA: 0

## 2025-08-05 ENCOUNTER — RESULTS FOLLOW-UP (OUTPATIENT)
Facility: CLINIC | Age: 40
End: 2025-08-05

## 2025-08-05 LAB
25(OH)D3 SERPL-MCNC: 40.6 NG/ML (ref 30–100)
ALBUMIN SERPL-MCNC: 4.3 G/DL (ref 3.5–5.2)
ALBUMIN/GLOB SERPL: 1.3 (ref 1.1–2.2)
ALP SERPL-CCNC: 55 U/L (ref 35–104)
ALT SERPL-CCNC: 13 U/L (ref 10–35)
ANION GAP SERPL CALC-SCNC: 13 MMOL/L (ref 2–14)
APPEARANCE UR: CLEAR
AST SERPL-CCNC: 17 U/L (ref 10–35)
BACTERIA URNS QL MICRO: ABNORMAL /HPF
BASOPHILS # BLD: 0.05 K/UL (ref 0–0.1)
BASOPHILS NFR BLD: 1.1 % (ref 0–1)
BILIRUB SERPL-MCNC: 0.4 MG/DL (ref 0–1.2)
BILIRUB UR QL: NEGATIVE
BUN SERPL-MCNC: 9 MG/DL (ref 6–20)
BUN/CREAT SERPL: 9 (ref 12–20)
CALCIUM SERPL-MCNC: 9.8 MG/DL (ref 8.6–10)
CHLORIDE SERPL-SCNC: 102 MMOL/L (ref 98–107)
CHOLEST SERPL-MCNC: 256 MG/DL (ref 0–200)
CO2 SERPL-SCNC: 26 MMOL/L (ref 20–29)
COLOR UR: ABNORMAL
CREAT SERPL-MCNC: 0.94 MG/DL (ref 0.6–1)
DIFFERENTIAL METHOD BLD: ABNORMAL
EOSINOPHIL # BLD: 0.05 K/UL (ref 0–0.4)
EOSINOPHIL NFR BLD: 1.1 % (ref 0–7)
EPITH CASTS URNS QL MICRO: ABNORMAL /LPF
ERYTHROCYTE [DISTWIDTH] IN BLOOD BY AUTOMATED COUNT: 18.1 % (ref 11.5–14.5)
EST. AVERAGE GLUCOSE BLD GHB EST-MCNC: 96 MG/DL
FERRITIN SERPL-MCNC: 21 NG/ML (ref 13–252)
FOLATE SERPL-MCNC: 38.2 NG/ML (ref 4.8–24.2)
GLOBULIN SER CALC-MCNC: 3.2 G/DL (ref 2–4)
GLUCOSE SERPL-MCNC: 86 MG/DL (ref 65–100)
GLUCOSE UR STRIP.AUTO-MCNC: NEGATIVE MG/DL
HBA1C MFR BLD: 5 % (ref 4–5.6)
HCT VFR BLD AUTO: 41.3 % (ref 35–47)
HDLC SERPL-MCNC: 61 MG/DL (ref 40–60)
HDLC SERPL: 4.2
HGB BLD-MCNC: 12.8 G/DL (ref 11.5–16)
HGB UR QL STRIP: NEGATIVE
HYALINE CASTS URNS QL MICRO: ABNORMAL /LPF (ref 0–5)
IMM GRANULOCYTES # BLD AUTO: 0.01 K/UL (ref 0–0.04)
IMM GRANULOCYTES NFR BLD AUTO: 0.2 % (ref 0–0.5)
IRON SATN MFR SERPL: 44 %
IRON SERPL-MCNC: 206 UG/DL (ref 37–145)
KETONES UR QL STRIP.AUTO: NEGATIVE MG/DL
LDLC SERPL CALC-MCNC: 160 MG/DL
LEUKOCYTE ESTERASE UR QL STRIP.AUTO: NEGATIVE
LYMPHOCYTES # BLD: 1.36 K/UL (ref 0.8–3.5)
LYMPHOCYTES NFR BLD: 31.1 % (ref 12–49)
MCH RBC QN AUTO: 25.3 PG (ref 26–34)
MCHC RBC AUTO-ENTMCNC: 31 G/DL (ref 30–36.5)
MCV RBC AUTO: 81.8 FL (ref 80–99)
MONOCYTES # BLD: 0.27 K/UL (ref 0–1)
MONOCYTES NFR BLD: 6.2 % (ref 5–13)
NEUTS SEG # BLD: 2.64 K/UL (ref 1.8–8)
NEUTS SEG NFR BLD: 60.3 % (ref 32–75)
NITRITE UR QL STRIP.AUTO: NEGATIVE
NRBC # BLD: 0 K/UL (ref 0–0.01)
NRBC BLD-RTO: 0 PER 100 WBC
PH UR STRIP: 6 (ref 5–8)
PLATELET # BLD AUTO: 287 K/UL (ref 150–400)
POTASSIUM SERPL-SCNC: 4.9 MMOL/L (ref 3.5–5.1)
PROT SERPL-MCNC: 7.5 G/DL (ref 6.4–8.3)
PROT UR STRIP-MCNC: NEGATIVE MG/DL
RBC # BLD AUTO: 5.05 M/UL (ref 3.8–5.2)
RBC #/AREA URNS HPF: ABNORMAL /HPF (ref 0–5)
SODIUM SERPL-SCNC: 140 MMOL/L (ref 136–145)
SP GR UR REFRACTOMETRY: 1.01 (ref 1–1.03)
TIBC SERPL-MCNC: 468 UG/DL (ref 250–450)
TRIGL SERPL-MCNC: 176 MG/DL (ref 0–150)
TSH, 3RD GENERATION: 1.85 UIU/ML (ref 0.27–4.2)
UIBC SERPL-MCNC: 262 UG/DL (ref 112–347)
URINE CULTURE IF INDICATED: ABNORMAL
UROBILINOGEN UR QL STRIP.AUTO: 0.2 EU/DL (ref 0.2–1)
VIT B12 SERPL-MCNC: 785 PG/ML (ref 232–1245)
VLDLC SERPL CALC-MCNC: 35 MG/DL
WBC # BLD AUTO: 4.4 K/UL (ref 3.6–11)
WBC URNS QL MICRO: ABNORMAL /HPF (ref 0–4)

## 2025-08-19 DIAGNOSIS — E55.9 VITAMIN D DEFICIENCY, UNSPECIFIED: ICD-10-CM

## 2025-08-19 RX ORDER — ERGOCALCIFEROL 1.25 MG/1
50000 CAPSULE, LIQUID FILLED ORAL WEEKLY
Qty: 4 CAPSULE | Refills: 2 | Status: SHIPPED | OUTPATIENT
Start: 2025-08-19